# Patient Record
Sex: FEMALE | Race: WHITE | NOT HISPANIC OR LATINO | Employment: OTHER | URBAN - NONMETROPOLITAN AREA
[De-identification: names, ages, dates, MRNs, and addresses within clinical notes are randomized per-mention and may not be internally consistent; named-entity substitution may affect disease eponyms.]

---

## 2021-05-26 ENCOUNTER — HOSPITAL ENCOUNTER (INPATIENT)
Facility: OTHER | Age: 81
LOS: 3 days | Discharge: HOME | DRG: 305 | End: 2021-05-29
Attending: FAMILY MEDICINE | Admitting: FAMILY MEDICINE
Payer: MEDICARE

## 2021-05-26 DIAGNOSIS — I16.1 HYPERTENSIVE EMERGENCY: Primary | ICD-10-CM

## 2021-05-26 DIAGNOSIS — E83.51 HYPOCALCEMIA: ICD-10-CM

## 2021-05-26 PROBLEM — E86.0 DEHYDRATION: Status: ACTIVE | Noted: 2021-05-26

## 2021-05-26 PROBLEM — K90.9 DIARRHEA DUE TO MALABSORPTION: Status: ACTIVE | Noted: 2021-05-26

## 2021-05-26 PROBLEM — R55 SYNCOPE: Status: ACTIVE | Noted: 2021-05-26

## 2021-05-26 PROBLEM — R19.7 DIARRHEA DUE TO MALABSORPTION: Status: ACTIVE | Noted: 2021-05-26

## 2021-05-26 PROBLEM — E87.6 HYPOKALEMIA: Status: ACTIVE | Noted: 2021-05-26

## 2021-05-26 LAB
ALBUMIN SERPL-MCNC: 3.3 G/DL (ref 3.5–5.7)
ALP SERPL-CCNC: 39 U/L (ref 34–104)
ALT SERPL W P-5'-P-CCNC: 6 U/L (ref 7–52)
ANION GAP SERPL CALCULATED.3IONS-SCNC: 10 MMOL/L (ref 3–14)
AST SERPL W P-5'-P-CCNC: 15 U/L (ref 13–39)
BILIRUB SERPL-MCNC: 1.2 MG/DL (ref 0.3–1)
BUN SERPL-MCNC: 7 MG/DL (ref 7–25)
CALCIUM SERPL-MCNC: 6.6 MG/DL (ref 8.6–10.3)
CHLORIDE SERPL-SCNC: 102 MMOL/L (ref 98–107)
CO2 SERPL-SCNC: 28 MMOL/L (ref 21–31)
CREAT SERPL-MCNC: 0.77 MG/DL (ref 0.6–1.2)
DEPRECATED CALCIDIOL+CALCIFEROL SERPL-MC: <10 NG/ML
GFR SERPL CREATININE-BSD FRML MDRD: 72 ML/MIN/{1.73_M2}
GLUCOSE SERPL-MCNC: 143 MG/DL (ref 70–105)
MAGNESIUM SERPL-MCNC: 2.3 MG/DL (ref 1.9–2.7)
POTASSIUM SERPL-SCNC: 3.2 MMOL/L (ref 3.5–5.1)
PROT SERPL-MCNC: 5.9 G/DL (ref 6.4–8.9)
SODIUM SERPL-SCNC: 140 MMOL/L (ref 134–144)
TROPONIN I SERPL-MCNC: 61.6 PG/ML

## 2021-05-26 PROCEDURE — 250N000009 HC RX 250: Performed by: FAMILY MEDICINE

## 2021-05-26 PROCEDURE — 84484 ASSAY OF TROPONIN QUANT: CPT | Performed by: FAMILY MEDICINE

## 2021-05-26 PROCEDURE — 250N000013 HC RX MED GY IP 250 OP 250 PS 637: Performed by: FAMILY MEDICINE

## 2021-05-26 PROCEDURE — 80053 COMPREHEN METABOLIC PANEL: CPT | Performed by: FAMILY MEDICINE

## 2021-05-26 PROCEDURE — 93005 ELECTROCARDIOGRAM TRACING: CPT

## 2021-05-26 PROCEDURE — 93010 ELECTROCARDIOGRAM REPORT: CPT | Performed by: INTERNAL MEDICINE

## 2021-05-26 PROCEDURE — 82306 VITAMIN D 25 HYDROXY: CPT | Performed by: FAMILY MEDICINE

## 2021-05-26 PROCEDURE — 99222 1ST HOSP IP/OBS MODERATE 55: CPT | Mod: AI | Performed by: FAMILY MEDICINE

## 2021-05-26 PROCEDURE — 250N000011 HC RX IP 250 OP 636: Performed by: FAMILY MEDICINE

## 2021-05-26 PROCEDURE — 258N000003 HC RX IP 258 OP 636: Performed by: FAMILY MEDICINE

## 2021-05-26 PROCEDURE — 36415 COLL VENOUS BLD VENIPUNCTURE: CPT | Performed by: FAMILY MEDICINE

## 2021-05-26 PROCEDURE — 200N000001 HC R&B ICU

## 2021-05-26 PROCEDURE — 83735 ASSAY OF MAGNESIUM: CPT | Performed by: FAMILY MEDICINE

## 2021-05-26 RX ORDER — CLONIDINE HYDROCHLORIDE 0.1 MG/1
0.2 TABLET ORAL 2 TIMES DAILY
Status: DISCONTINUED | OUTPATIENT
Start: 2021-05-26 | End: 2021-05-29 | Stop reason: HOSPADM

## 2021-05-26 RX ORDER — SODIUM CHLORIDE AND POTASSIUM CHLORIDE 150; 900 MG/100ML; MG/100ML
INJECTION, SOLUTION INTRAVENOUS CONTINUOUS
Status: DISCONTINUED | OUTPATIENT
Start: 2021-05-26 | End: 2021-05-28

## 2021-05-26 RX ORDER — POTASSIUM CHLORIDE 1500 MG/1
40 TABLET, EXTENDED RELEASE ORAL ONCE
Status: COMPLETED | OUTPATIENT
Start: 2021-05-26 | End: 2021-05-26

## 2021-05-26 RX ORDER — PROCHLORPERAZINE MALEATE 5 MG
5 TABLET ORAL EVERY 6 HOURS PRN
Status: DISCONTINUED | OUTPATIENT
Start: 2021-05-26 | End: 2021-05-29 | Stop reason: HOSPADM

## 2021-05-26 RX ORDER — LIDOCAINE 40 MG/G
CREAM TOPICAL
Status: DISCONTINUED | OUTPATIENT
Start: 2021-05-26 | End: 2021-05-29 | Stop reason: HOSPADM

## 2021-05-26 RX ORDER — ONDANSETRON 4 MG/1
4 TABLET, ORALLY DISINTEGRATING ORAL EVERY 6 HOURS PRN
Status: DISCONTINUED | OUTPATIENT
Start: 2021-05-26 | End: 2021-05-29 | Stop reason: HOSPADM

## 2021-05-26 RX ORDER — ACETAMINOPHEN 325 MG/1
650 TABLET ORAL EVERY 4 HOURS PRN
Status: DISCONTINUED | OUTPATIENT
Start: 2021-05-26 | End: 2021-05-29 | Stop reason: HOSPADM

## 2021-05-26 RX ORDER — ONDANSETRON 2 MG/ML
4 INJECTION INTRAMUSCULAR; INTRAVENOUS EVERY 6 HOURS PRN
Status: DISCONTINUED | OUTPATIENT
Start: 2021-05-26 | End: 2021-05-29 | Stop reason: HOSPADM

## 2021-05-26 RX ORDER — SPIRONOLACTONE 25 MG/1
25 TABLET ORAL DAILY
Status: DISCONTINUED | OUTPATIENT
Start: 2021-05-26 | End: 2021-05-27

## 2021-05-26 RX ORDER — LISINOPRIL 40 MG/1
40 TABLET ORAL DAILY
Status: DISCONTINUED | OUTPATIENT
Start: 2021-05-27 | End: 2021-05-29 | Stop reason: HOSPADM

## 2021-05-26 RX ORDER — DIPHENOXYLATE HCL/ATROPINE 2.5-.025MG
1 TABLET ORAL 4 TIMES DAILY PRN
Status: DISCONTINUED | OUTPATIENT
Start: 2021-05-26 | End: 2021-05-29 | Stop reason: HOSPADM

## 2021-05-26 RX ORDER — PROCHLORPERAZINE 25 MG
12.5 SUPPOSITORY, RECTAL RECTAL EVERY 12 HOURS PRN
Status: DISCONTINUED | OUTPATIENT
Start: 2021-05-26 | End: 2021-05-29 | Stop reason: HOSPADM

## 2021-05-26 RX ORDER — HYDRALAZINE HYDROCHLORIDE 20 MG/ML
10 INJECTION INTRAMUSCULAR; INTRAVENOUS EVERY 6 HOURS PRN
Status: DISCONTINUED | OUTPATIENT
Start: 2021-05-26 | End: 2021-05-29 | Stop reason: HOSPADM

## 2021-05-26 RX ADMIN — CALCIUM GLUCONATE 1 G: 94 INJECTION, SOLUTION INTRAVENOUS at 22:48

## 2021-05-26 RX ADMIN — CLONIDINE HYDROCHLORIDE 0.2 MG: 0.1 TABLET ORAL at 21:52

## 2021-05-26 RX ADMIN — POTASSIUM CHLORIDE AND SODIUM CHLORIDE: 900; 150 INJECTION, SOLUTION INTRAVENOUS at 21:52

## 2021-05-26 RX ADMIN — POTASSIUM CHLORIDE 40 MEQ: 1500 TABLET, EXTENDED RELEASE ORAL at 22:47

## 2021-05-26 RX ADMIN — FAMOTIDINE 20 MG: 10 INJECTION, SOLUTION INTRAVENOUS at 21:52

## 2021-05-26 ASSESSMENT — MIFFLIN-ST. JEOR: SCORE: 1136.38

## 2021-05-27 LAB
ALBUMIN SERPL-MCNC: 2.9 G/DL (ref 3.5–5.7)
ALP SERPL-CCNC: 35 U/L (ref 34–104)
ALT SERPL W P-5'-P-CCNC: 4 U/L (ref 7–52)
ANION GAP SERPL CALCULATED.3IONS-SCNC: 8 MMOL/L (ref 3–14)
AST SERPL W P-5'-P-CCNC: 12 U/L (ref 13–39)
BILIRUB SERPL-MCNC: 1.1 MG/DL (ref 0.3–1)
BUN SERPL-MCNC: 7 MG/DL (ref 7–25)
CALCIUM SERPL-MCNC: 6.4 MG/DL (ref 8.6–10.3)
CALCIUM SERPL-MCNC: 6.5 MG/DL (ref 8.6–10.3)
CALCIUM SERPL-MCNC: 6.6 MG/DL (ref 8.6–10.3)
CHLORIDE SERPL-SCNC: 106 MMOL/L (ref 98–107)
CO2 SERPL-SCNC: 27 MMOL/L (ref 21–31)
CREAT SERPL-MCNC: 0.72 MG/DL (ref 0.6–1.2)
ERYTHROCYTE [DISTWIDTH] IN BLOOD BY AUTOMATED COUNT: 15 % (ref 10–15)
GFR SERPL CREATININE-BSD FRML MDRD: 78 ML/MIN/{1.73_M2}
GLUCOSE SERPL-MCNC: 104 MG/DL (ref 70–105)
HCT VFR BLD AUTO: 27.5 % (ref 35–47)
HGB BLD-MCNC: 8.9 G/DL (ref 11.7–15.7)
INTERPRETATION ECG - MUSE: NORMAL
MAGNESIUM SERPL-MCNC: 2.1 MG/DL (ref 1.9–2.7)
MCH RBC QN AUTO: 30.5 PG (ref 26.5–33)
MCHC RBC AUTO-ENTMCNC: 32.4 G/DL (ref 31.5–36.5)
MCV RBC AUTO: 94 FL (ref 78–100)
PLATELET # BLD AUTO: 183 10E9/L (ref 150–450)
POTASSIUM SERPL-SCNC: 3.7 MMOL/L (ref 3.5–5.1)
PROT SERPL-MCNC: 4.8 G/DL (ref 6.4–8.9)
PTH-INTACT SERPL-MCNC: 242 PG/ML (ref 12–88)
RBC # BLD AUTO: 2.92 10E12/L (ref 3.8–5.2)
SODIUM SERPL-SCNC: 141 MMOL/L (ref 134–144)
TROPONIN I SERPL-MCNC: 46.5 PG/ML
WBC # BLD AUTO: 5.2 10E9/L (ref 4–11)

## 2021-05-27 PROCEDURE — 85027 COMPLETE CBC AUTOMATED: CPT | Performed by: FAMILY MEDICINE

## 2021-05-27 PROCEDURE — 99232 SBSQ HOSP IP/OBS MODERATE 35: CPT | Performed by: FAMILY MEDICINE

## 2021-05-27 PROCEDURE — 36415 COLL VENOUS BLD VENIPUNCTURE: CPT | Performed by: FAMILY MEDICINE

## 2021-05-27 PROCEDURE — 84484 ASSAY OF TROPONIN QUANT: CPT | Performed by: FAMILY MEDICINE

## 2021-05-27 PROCEDURE — 80053 COMPREHEN METABOLIC PANEL: CPT | Performed by: FAMILY MEDICINE

## 2021-05-27 PROCEDURE — 250N000009 HC RX 250: Performed by: FAMILY MEDICINE

## 2021-05-27 PROCEDURE — 250N000011 HC RX IP 250 OP 636: Performed by: INTERNAL MEDICINE

## 2021-05-27 PROCEDURE — 83735 ASSAY OF MAGNESIUM: CPT | Performed by: FAMILY MEDICINE

## 2021-05-27 PROCEDURE — 200N000001 HC R&B ICU

## 2021-05-27 PROCEDURE — 258N000003 HC RX IP 258 OP 636: Performed by: INTERNAL MEDICINE

## 2021-05-27 PROCEDURE — 250N000011 HC RX IP 250 OP 636: Performed by: FAMILY MEDICINE

## 2021-05-27 PROCEDURE — 82310 ASSAY OF CALCIUM: CPT | Performed by: FAMILY MEDICINE

## 2021-05-27 PROCEDURE — 250N000013 HC RX MED GY IP 250 OP 250 PS 637: Performed by: FAMILY MEDICINE

## 2021-05-27 PROCEDURE — 83970 ASSAY OF PARATHORMONE: CPT | Performed by: FAMILY MEDICINE

## 2021-05-27 RX ORDER — CALCIUM CARBONATE 500 MG/1
1000 TABLET, CHEWABLE ORAL ONCE
Status: COMPLETED | OUTPATIENT
Start: 2021-05-27 | End: 2021-05-27

## 2021-05-27 RX ORDER — LOPERAMIDE HCL 2 MG
2 CAPSULE ORAL 4 TIMES DAILY PRN
COMMUNITY

## 2021-05-27 RX ORDER — PANTOPRAZOLE SODIUM 40 MG/1
40 TABLET, DELAYED RELEASE ORAL DAILY
Status: ON HOLD | COMMUNITY
End: 2022-09-26

## 2021-05-27 RX ORDER — FUROSEMIDE 10 MG/ML
20 INJECTION INTRAMUSCULAR; INTRAVENOUS ONCE
Status: COMPLETED | OUTPATIENT
Start: 2021-05-27 | End: 2021-05-27

## 2021-05-27 RX ORDER — CLONIDINE HYDROCHLORIDE 0.2 MG/1
0.2 TABLET ORAL 2 TIMES DAILY
COMMUNITY

## 2021-05-27 RX ORDER — ATENOLOL 100 MG/1
100 TABLET ORAL EVERY EVENING
COMMUNITY
End: 2022-09-26

## 2021-05-27 RX ORDER — LISINOPRIL 40 MG/1
40 TABLET ORAL DAILY
COMMUNITY

## 2021-05-27 RX ORDER — ACETAMINOPHEN 500 MG
500 TABLET ORAL EVERY 6 HOURS PRN
COMMUNITY

## 2021-05-27 RX ORDER — FUROSEMIDE 20 MG
20 TABLET ORAL DAILY
COMMUNITY
End: 2022-09-26

## 2021-05-27 RX ORDER — NITROFURANTOIN 25; 75 MG/1; MG/1
100 CAPSULE ORAL 2 TIMES DAILY PRN
COMMUNITY
End: 2022-09-26

## 2021-05-27 RX ORDER — VITAMIN B COMPLEX
125 TABLET ORAL DAILY
Status: DISCONTINUED | OUTPATIENT
Start: 2021-05-27 | End: 2021-05-29 | Stop reason: HOSPADM

## 2021-05-27 RX ADMIN — FAMOTIDINE 20 MG: 10 INJECTION, SOLUTION INTRAVENOUS at 21:59

## 2021-05-27 RX ADMIN — ACETAMINOPHEN 650 MG: 325 TABLET, FILM COATED ORAL at 11:53

## 2021-05-27 RX ADMIN — CALCIUM CARBONATE (ANTACID) CHEW TAB 500 MG 1000 MG: 500 CHEW TAB at 14:45

## 2021-05-27 RX ADMIN — CALCIUM CARBONATE (ANTACID) CHEW TAB 500 MG 1000 MG: 500 CHEW TAB at 07:28

## 2021-05-27 RX ADMIN — CALCIUM GLUCONATE 1 G: 94 INJECTION, SOLUTION INTRAVENOUS at 22:35

## 2021-05-27 RX ADMIN — CLONIDINE HYDROCHLORIDE 0.2 MG: 0.1 TABLET ORAL at 21:57

## 2021-05-27 RX ADMIN — CLONIDINE HYDROCHLORIDE 0.2 MG: 0.1 TABLET ORAL at 09:26

## 2021-05-27 RX ADMIN — CHOLECALCIFEROL (VITAMIN D3) 25 MCG (1,000 UNIT) TABLET 125 MCG: at 09:26

## 2021-05-27 RX ADMIN — CALCIUM GLUCONATE 1 G: 94 INJECTION, SOLUTION INTRAVENOUS at 21:04

## 2021-05-27 RX ADMIN — FUROSEMIDE 20 MG: 10 INJECTION, SOLUTION INTRAMUSCULAR; INTRAVENOUS at 09:26

## 2021-05-27 RX ADMIN — POTASSIUM CHLORIDE AND SODIUM CHLORIDE: 900; 150 INJECTION, SOLUTION INTRAVENOUS at 15:13

## 2021-05-27 RX ADMIN — ACETAMINOPHEN 650 MG: 325 TABLET, FILM COATED ORAL at 07:29

## 2021-05-27 RX ADMIN — FAMOTIDINE 20 MG: 10 INJECTION, SOLUTION INTRAVENOUS at 09:26

## 2021-05-27 RX ADMIN — LISINOPRIL 40 MG: 40 TABLET ORAL at 09:26

## 2021-05-27 ASSESSMENT — ACTIVITIES OF DAILY LIVING (ADL)
ADLS_ACUITY_SCORE: 18
ADLS_ACUITY_SCORE: 17

## 2021-05-27 ASSESSMENT — MIFFLIN-ST. JEOR: SCORE: 1142.38

## 2021-05-27 NOTE — PHARMACY-ADMISSION MEDICATION HISTORY
"Pharmacy -- Admission Medication Reconciliation    Prior to admission (PTA) medications were reviewed and the patient's PTA medication list was updated.    Sources Consulted: Patient phone interview, [facility] med list and fill history ([phone], spoke with Vivian), [facility] Everywhere notes    The reliability of this Medication Reconciliation is: Reliability: Reliable    The following significant changes were made:  -Added patient's preferred outpatient pharmacy ([facility], [address] [place] [address] location)  -Added Atenolol  -Added Clonidine  -Added Lisinopril  -Added Loperamide  -Added Acetaminophen- patient states she takes PRN, but usually takes 3,000 mg/day  -Added Pantoprazole  -Added Furosemide- per [facility] list, Furosemide is listed as daily SCHEDULED, but patient reports only taking PRN, as she \"lives in the bathroom\" if she takes it consistently. Reports only taking about 2x/week. Per [facility], this was last filled in 2019- when patient was asked about this, she states that she has 2 full bottles at home.      **Notes: patient is prescribed Macrobid, which she reports that she always has on hand due to frequent UTI's. Reports that she only takes one or two at first onset of symptoms of a UTI to \"nip it in the bud.\"    In addition, the patient's allergies were reviewed with the patient and updated as follows:   Allergies: Baclofen, Cortisone, Nabumetone, Sulfa drugs, Erythromycin, Lactose, Nifedipine, Orange fruit [citrus], Amlodipine, Beta adrenergic blockers, Ciprofloxacin, Codeine, Cozaar [losartan], Darvon [propoxyphene], Dilaudid [hydromorphone], Doxazosin, Egg yolk, Ferrous sulfate, Hydrochlorothiazide, Penicillins, Talwin [pentazocine], Vasotec [enalapril], and Yellow dye, Spironolactone    **Please note, all allergies were added except for codeine- this list updated to reflect allergy list in [facility] Everywhere notes. Patient unsure of most allergies off the " "top of her head, but states that [facility] list should be accurate because she \"keeps on them to keep updated.\" Spironolactone not yet listed as an allergy in [facility] list, but patient reports that she was recently prescribed (5/7, per notes) and that she stopped taking after a few days due to diarrhea and confusion.    -Patient unsure of specific beta-blockers she has tried, but reports that she tolerates Atenolol, and only one other one, but is unsure of name. Reports that she has an updated allergy list she usually keeps in her purse, but did not bring it with her.      The pharmacist has reviewed with the patient that all personal medications should be removed from the building or locked in the belongings safe.  Patient shall only take medications ordered by the physician and administered by the nursing staff.       Medication barriers identified: patient reports not taking Furosemide as prescribed, as it interferes with her daily activities- reports only taking 2x/week   Medication adherence concerns: See above note regarding Furosemide; patient denies any missed doses with other meds   Understanding of emergency medications: N/A    Marcos Hicks [facility], 5/27/2021,  10:54 AM     "

## 2021-05-27 NOTE — PROGRESS NOTES
[facility]    Medicine Progress Note - Hospitalist Service       Date of Admission:  5/26/2021  Assessment & Plan       Annie Grover is a 80 year old female admitted on 5/26/2021. She has had a few days of profuse watery diarrhea.  Today had a syncopal episode in the garage.  No head trauma but was found in outside emergency department to have severe hypokalemia, hypomagnesemia, hypocalcemia as well as elevated blood pressure.    She reports chronically elevated blood pressure with typical readings in the 150s systolic.  Denies any symptoms for endorgan damage such as headache, confusion, chest pain, shortness of breath.    Patient's troponin has trended downward overnight with improved blood pressure control.    Drop in hemoglobin likely dilutional.    Principal Problem:    Hypertensive emergency     Assessment: Blood pressure has improved overnight.  Has not required hydralazine.    Plan: Continue home medications.  Hydralazine as needed SBP greater than 180.  Active Problems:    Hypocalcemia    Assessment: Unclear etiology, urgent was told PTH was done at outside facility but I do not see that pending.  Will order.  Vitamin D less than 10, will replace.  May be simply due to losses with poor intake and malabsorption/diarrhea.  Currently appears asymptomatic.    Plan: Corrected calcium around 7.5 this morning.  Will give oral calcium and recheck in six hours.     Syncope    Assessment: Unclear etiology.  No recurrent symptoms.  Most likely due to dehydration and electrolyte abnormality.    Plan: Continue on telemetry.     Diarrhea due to malabsorption    Assessment: Patient reports this is due to spironolactone and frequently will have issues with medications and food since undergoing radiation 34 years ago.    Plan: Imodium as needed.    Dehydration    Assessment: Patient received IV fluids at outside facility.  Currently euvolemic to slightly edematous although intravascularly  may be borderline deplete    Plan: reduce IVF to TKo and give one dose of lasix.    Hypokalemia    Assessment: Now normalized.    Plan:  MOnitor   Sciatica   Assessment:  Chronic, made worse by hospital bed.    Plan: PT/OT         Diet: Regular Diet Adult    DVT Prophylaxis: Pneumatic Compression Devices  Echavarria Catheter: not present  Code Status: Full Code           Disposition Plan   Expected discharge: 1-2d, recommended to prior living arrangement once blood pressure less than 160/90.  Entered: Kale Arreola MD 05/27/2021, 8:25 AM       The patient's care was discussed with the Patient and Patient's Family.    Kale Arreola MD  Hospitalist Service  [facility]  Contact information available via [facility] Paging/Directory    ______________________________________________________________________    Interval History   Patient reports that she feels better today.  Diarrhea has slowed down.  Has been able to eat a good breakfast.  Is having some sciatic-like pain.  This is a chronic issue for her but is worse with sleeping in hospital bed overnight.    She continues to deny any chest pain, shortness of breath or any other cardiopulmonary symptoms.  She has not had any recurrent episodes of dizziness or presyncope.  Nursing notes reviewed.  No fevers or chills.    Data reviewed today: I reviewed all medications, new labs and imaging results over the last 24 hours. I personally reviewed no images or EKG's today.    Physical Exam   Vital Signs: Temp: 98  F (36.7  C) Temp src: Temporal BP: (!) 162/77 Pulse: 73   Resp: 18 SpO2: 94 % O2 Device: None (Room air)    Weight: 160 lbs 14.97 oz  Constitutional: awake, alert, cooperative, no apparent distress, and appears stated age  Respiratory: No increased work of breathing, good air exchange, clear to auscultation bilaterally, no crackles or wheezing  Cardiovascular: Regular rate and rhythm.  No murmurs rubs or gallops heard.   GI: Abdomen Soft,  non-tender.  No masses, rebound or guarding.   Neuropsychiatric: General: normal, calm and normal eye contact  Orientation: oriented to self, place, time and situation  Memory and insight: memory for past and recent events intact and thought process normal    Data   Recent Labs   Lab 05/27/21 0523 05/26/21  2121   WBC 5.2  --    HGB 8.9*  --    MCV 94  --      --     140   POTASSIUM 3.7 3.2*   CHLORIDE 106 102   CO2 27 28   BUN 7 7   CR 0.72 0.77   ANIONGAP 8 10   LOLI 6.4* 6.6*    143*   ALBUMIN 2.9* 3.3*   PROTTOTAL 4.8* 5.9*   BILITOTAL 1.1* 1.2*   ALKPHOS 35 39   ALT 4* 6*   AST 12* 15     No results found for this or any previous visit (from the past 24 hour(s)).

## 2021-05-27 NOTE — PROGRESS NOTES
Resting in bed.  Med per request for sciatic pain.  See MAR.  No c/o otherwise.  Arely.  Mayte.  MD here.  Cont to monitor.

## 2021-05-27 NOTE — PROGRESS NOTES
Resting in bed.  Has been up to BR frequently after lasix given.  Edema in LE's improved.  Med with tylenol for knee pain.  Cont to monitor.

## 2021-05-27 NOTE — PROGRESS NOTES
Admission Note    Data:  Annie Grover admitted to ICU room 917 from [place], [place], emergency room at 2040.      Action:  Dr. Arreola has been notified of admission. Pt oriented to unit, call light in reach.     Response:  Patient AAOx4, tolerated transfer well, denies pain, 1 bag of belongings and cane.    Magdiel Jimenez RN on 5/26/2021 at 8:46 PM

## 2021-05-27 NOTE — PROGRESS NOTES
MIRTHA Escobedo from [facility]  ER. called with report on Pt. Pt is to be admitted to ICU bed 917.  Report received by this RN. Pt has not yet left their facility. MIRTHA Escobedo verbalized they will call with update when Pt is ready to depart their hospital to transfer here via ambulance. MIRTHA Guillen

## 2021-05-27 NOTE — H&P
[facility]    History and Physical - Hospitalist Service       Date of Admission:  5/26/2021    Assessment & Plan   Annie Grover is a 80 year old female admitted on 5/26/2021. She has had a few days of profuse watery diarrhea.  Today had a syncopal episode in the garage.  No head trauma but was found in outside emergency department to have severe hypokalemia, hypomagnesemia, hypocalcemia as well as elevated blood pressure.    She reports chronically elevated blood pressure with typical readings in the 150s systolic.  Denies any symptoms for endorgan damage such as headache, confusion, chest pain, shortness of breath.    She does have persistent elevated troponin, slightly rising with no symptoms of angina or anginal equivalents.  This was discussed with cardiology by the emergency department physician and they felt it was due to creased demand with her hypertension and suggested medical management.  Her current condition supports that.  We will work on lowering her blood pressure and obtain repeat troponin in a.m.    Principal Problem:    Hypertensive emergency     Assessment: Blood pressure remains elevated.  Has been given her normal home medications except for her spironolactone which she blames for her current episode of diarrhea.  We will try to gain better control utilizing hydralazine.    Plan: Continue home medications.  Add hydralazine 10 mg IV every 6 hours as needed systolic blood pressure greater than 180.  Active Problems:    Hypocalcemia    Assessment: Unclear etiology, PTH pending.  Vitamin D pending.  May be simply due to losses with poor intake and malabsorption/diarrhea.  Currently appears asymptomatic.    Plan: Corrected calcium around 7.4.  Will add an additional gram of IV calcium and recheck in 6 hours.  Hopefully then we can transition to oral supplementation.     Syncope    Assessment: Unclear etiology.  No recurrent symptoms.  Most likely due to dehydration  and electrolyte abnormality.    Plan: Continue on telemetry.  Consider echocardiogram tomorrow.    Diarrhea due to malabsorption    Assessment: Patient reports this is due to spironolactone and frequently will have issues with medications and food since undergoing radiation 34 years ago.    Plan: Imodium as needed.    Dehydration    Assessment: Patient received IV fluids at outside facility.  Currently relatively euvolemic versus slightly hypovolemic yet.    Plan: Normal saline with 20 KCl at 100 mL/h.    Hypokalemia    Assessment: Had potassium of 2.4 on initial presentation, now has improved significantly but still below normal.    Plan: We will put 20 mEq/L of potassium in her IV fluid.  Also placed on magnesium and potassium replacement protocols       Diet: Advance Diet as Tolerated: Clear Liquid Diet    DVT Prophylaxis: Pneumatic Compression Devices  Echavarria Catheter: not present  Code Status: Full Code           Disposition Plan   Expected discharge: 2 - 3 days, recommended to prior living arrangement once blood pressure stable and electrolytes normalized.  Entered: Kale Arreola MD 05/26/2021, 10:13 PM     The patient's care was discussed with the Patient.    Kale Arreola MD  [facility]  Contact information available via [facility] Paging/Directory      ______________________________________________________________________    Chief Complaint   Diarrhea, dehydration, syncopal episode, electrolyte abnormalities    History is obtained from the patient    History of Present Illness   Annie Grover is a 80 year old female who presented via EMS to [place] emergency department after having syncopal episode.  Patient reports she has had several days of profuse watery diarrhea.  She thinks this is due to her spironolactone.  She reports several episodes with similar quality diarrhea that eventually resolved on its own.  Can be brought about by certain vegetables or medications.   Has had issues with it ever since having radiation following total abdominal hysterectomy for endometrial cancer.  She does not recall feeling dizzy prior to the event.  She denies any recent or remote chest pain, palpitations shortness of breath, transient or persistent neurologic deficits.    In the emergency department was found to have profound hypokalemia, hypomagnesemia and hypocalcemia.  Was given 3 g of magnesium, 2 g calcium carbonate and 60 mEq of potassium.  Was also found to be acutely hypertensive.  Patient does report chronically having systolic blood pressures in the 150s.  She had not taken her antihypertensives for the day.  These were given except for the spironolactone which she refused.    Currently she tells me that she feels well.  She reports no symptoms of hypertensive urgency such as headache, confusion, chest pain, shortness of breath, exertional indigestion.  She was found to have slowly rising mild troponin elevation however.  Cardiology consult and felt this was most likely consistent with increased demand due to hypertension and suggested medical management.    Review of Systems    CONSTITUTIONAL: NEGATIVE for fever, chills, change in weight  INTEGUMENTARY/SKIN: NEGATIVE for worrisome rashes, moles or lesions  EYES: NEGATIVE for vision changes or irritation  ENT/MOUTH: NEGATIVE for ear, mouth and throat problems  RESP: NEGATIVE for significant cough or SOB  CV: NEGATIVE for chest pain, palpitations or peripheral edema  GI: See HPI  : NEGATIVE for frequency, dysuria, or hematuria  MUSCULOSKELETAL: NEGATIVE for significant arthralgias or myalgia  NEURO: See HPI  ENDOCRINE: NEGATIVE for temperature intolerance, skin/hair changes  HEME: NEGATIVE for bleeding problems  PSYCHIATRIC: NEGATIVE for changes in mood or affect    Past Medical History      Allergic rhinitis due to other allergen 11/27/2000     Amblyopia, unspecified 08/31/2000   Right     Chalazion 08/31/2000   Left lower lid      HCD (health care directive) 10/25/2016     Malignant neoplasm of corpus uteri, except isthmus (HCC) 2/25/2004   Uterine cancer S/P hysterectomy     Partial small bowel obstruction (HCC) 11/12/2017     Retinal vascular occlusion, unspecified 2/25/2004   Retinal vessel occlusion     Unspecified essential hypertension 2/25/2004     Urinary tract infection, site not specified 11/12/2004     Past Surgical History     BIOPSY OF SKIN LESION 07/05/2000   Right lower lid     BIOPSY OF SKIN LESION 01/03/2011   Left shoulder - hyperkeratosis with regular acanthosis and minimal epidermal atypia     TOTAL ABDOM HYSTERECTOMY 1983   TAHBSO for uterine cancer     URETHRA SURGERY N/A 12/18/2015   Procedure: CYSTOSCOPY BULKING URETHRAL, intravesical botox injection 100 units; Surgeon: Jay Arredondo DO; Location: [facility] OR     URETHRA SURGERY N/A 3/23/2016   Procedure: intravesical Botox 150 units; Surgeon: Jay Arredondo DO; Location: [facility] OR     Social History   I have reviewed this patient's social history and updated it with pertinent information if needed.  Social History     Tobacco Use     Smoking status: Not on file   Substance Use Topics     Alcohol use: Not on file     Drug use: Not on file     Patient is .  She lives with her son which she reports has some developmental delay and cannot drive except in emergencies.  She has never smoked and does not drink alcohol.      Family History     Cardiovascular Disease Father   CHF     Rheumatoid Arthritis Mother     Other Son   Developemental delay - adopted       Prior to Admission Medications   None     Allergies   Allergies   Allergen Reactions     Codeine Nausea and Vomiting       Physical Exam   Vital Signs: Temp: 98.5  F (36.9  C) Temp src: Temporal BP: (!) 194/89 Pulse: 79   Resp: 19 SpO2: 98% O2 Device: None (Room air)    Weight: 0 lbs 0 oz    Constitutional: awake, alert, cooperative, no apparent distress, and appears stated age  Eyes: Lids and lashes normal,  pupils equal, round and reactive to light, extra ocular muscles intact, sclera clear, conjunctiva normal  ENT: Normocephalic, without obvious abnormality, atraumatic, sinuses nontender on palpation, external ears without lesions, oral pharynx with moist mucous membranes  Hematologic / Lymphatic: No lymphadenopathy  Respiratory: Clear to auscultation bilaterally  Cardiovascular: Regular rate and rhythm.  No murmurs rubs or gallops.  No lower extremity edema  GI: Abdomen soft, nontender.  Bowel sounds heard in all quadrants.  Genitounirinary: No suprapubic or CVA tenderness.  Skin: Patient has scrapes and bruises on both hands right greater than left.  No evidence of trauma on her head.  Musculoskeletal: No synovitis.  Muscle strength age and body habitus appropriateas well as equal and even.   Neurologic: Awake, alert, oriented to name, place and time.  Cranial nerves II-XII are grossly intact.  Motor is 5 out of 5 bilaterally.  Cerebellar finger to nose, heel to shin intact.  Sensory is intact.  Negative Chvostek sign.  Neuropsychiatric: General: normal, calm and normal eye contact  Orientation: oriented to self, place, time and situation  Memory and insight: memory for past and recent events intact and thought process normal    Data   Data reviewed today: I reviewed all medications, new labs and imaging results over the last 24 hours. I personally reviewed the EKG tracing showing Normal sinus rhythm with long QT..    Recent Labs   Lab 05/26/21  2121      POTASSIUM 3.2*   CHLORIDE 102   CO2 28   BUN 7   CR 0.77   ANIONGAP 10   LOLI 6.6*   *   ALBUMIN 3.3*   PROTTOTAL 5.9*   BILITOTAL 1.2*   ALKPHOS 39   ALT 6*   AST 15     No results found for this or any previous visit (from the past 24 hour(s)).

## 2021-05-28 LAB
ALBUMIN SERPL-MCNC: 2.9 G/DL (ref 3.5–5.7)
ALP SERPL-CCNC: 36 U/L (ref 34–104)
ALT SERPL W P-5'-P-CCNC: 5 U/L (ref 7–52)
ANION GAP SERPL CALCULATED.3IONS-SCNC: 7 MMOL/L (ref 3–14)
AST SERPL W P-5'-P-CCNC: 12 U/L (ref 13–39)
BILIRUB SERPL-MCNC: 1.2 MG/DL (ref 0.3–1)
BUN SERPL-MCNC: 10 MG/DL (ref 7–25)
CALCIUM SERPL-MCNC: 7.2 MG/DL (ref 8.6–10.3)
CHLORIDE SERPL-SCNC: 107 MMOL/L (ref 98–107)
CO2 SERPL-SCNC: 27 MMOL/L (ref 21–31)
CREAT SERPL-MCNC: 0.71 MG/DL (ref 0.6–1.2)
GFR SERPL CREATININE-BSD FRML MDRD: 79 ML/MIN/{1.73_M2}
GLUCOSE SERPL-MCNC: 116 MG/DL (ref 70–105)
MAGNESIUM SERPL-MCNC: 1.4 MG/DL (ref 1.9–2.7)
POTASSIUM SERPL-SCNC: 3.6 MMOL/L (ref 3.5–5.1)
PROT SERPL-MCNC: 5 G/DL (ref 6.4–8.9)
SODIUM SERPL-SCNC: 141 MMOL/L (ref 134–144)

## 2021-05-28 PROCEDURE — 250N000009 HC RX 250: Performed by: FAMILY MEDICINE

## 2021-05-28 PROCEDURE — 80053 COMPREHEN METABOLIC PANEL: CPT | Performed by: FAMILY MEDICINE

## 2021-05-28 PROCEDURE — 36415 COLL VENOUS BLD VENIPUNCTURE: CPT | Performed by: FAMILY MEDICINE

## 2021-05-28 PROCEDURE — 250N000013 HC RX MED GY IP 250 OP 250 PS 637: Performed by: FAMILY MEDICINE

## 2021-05-28 PROCEDURE — 250N000011 HC RX IP 250 OP 636: Performed by: FAMILY MEDICINE

## 2021-05-28 PROCEDURE — 83735 ASSAY OF MAGNESIUM: CPT | Performed by: FAMILY MEDICINE

## 2021-05-28 PROCEDURE — 99232 SBSQ HOSP IP/OBS MODERATE 35: CPT | Performed by: FAMILY MEDICINE

## 2021-05-28 PROCEDURE — 120N000001 HC R&B MED SURG/OB

## 2021-05-28 PROCEDURE — 250N000013 HC RX MED GY IP 250 OP 250 PS 637: Performed by: INTERNAL MEDICINE

## 2021-05-28 RX ORDER — CALCIUM CARBONATE 500 MG/1
1000 TABLET, CHEWABLE ORAL 2 TIMES DAILY
Status: DISCONTINUED | OUTPATIENT
Start: 2021-05-28 | End: 2021-05-29 | Stop reason: HOSPADM

## 2021-05-28 RX ORDER — TRAMADOL HYDROCHLORIDE 50 MG/1
50 TABLET ORAL ONCE
Status: COMPLETED | OUTPATIENT
Start: 2021-05-28 | End: 2021-05-28

## 2021-05-28 RX ORDER — LIDOCAINE 40 MG/G
CREAM TOPICAL EVERY 4 HOURS PRN
Status: COMPLETED | OUTPATIENT
Start: 2021-05-28 | End: 2021-05-28

## 2021-05-28 RX ORDER — ATENOLOL 50 MG/1
100 TABLET ORAL AT BEDTIME
Status: DISCONTINUED | OUTPATIENT
Start: 2021-05-28 | End: 2021-05-29 | Stop reason: HOSPADM

## 2021-05-28 RX ORDER — MAGNESIUM SULFATE HEPTAHYDRATE 40 MG/ML
4 INJECTION, SOLUTION INTRAVENOUS ONCE
Status: COMPLETED | OUTPATIENT
Start: 2021-05-28 | End: 2021-05-28

## 2021-05-28 RX ORDER — CALCIUM CARBONATE 500 MG/1
1000 TABLET, CHEWABLE ORAL 2 TIMES DAILY PRN
Status: DISCONTINUED | OUTPATIENT
Start: 2021-05-28 | End: 2021-05-28

## 2021-05-28 RX ORDER — FAMOTIDINE 20 MG/1
20 TABLET, FILM COATED ORAL 2 TIMES DAILY
Status: DISCONTINUED | OUTPATIENT
Start: 2021-05-28 | End: 2021-05-29 | Stop reason: HOSPADM

## 2021-05-28 RX ORDER — HYDRALAZINE HYDROCHLORIDE 10 MG/1
10 TABLET, FILM COATED ORAL EVERY 8 HOURS SCHEDULED
Status: DISCONTINUED | OUTPATIENT
Start: 2021-05-28 | End: 2021-05-29 | Stop reason: HOSPADM

## 2021-05-28 RX ORDER — CALCIUM CARBONATE 500 MG/1
500 TABLET, CHEWABLE ORAL DAILY PRN
Status: DISCONTINUED | OUTPATIENT
Start: 2021-05-28 | End: 2021-05-29 | Stop reason: HOSPADM

## 2021-05-28 RX ADMIN — ACETAMINOPHEN 650 MG: 325 TABLET, FILM COATED ORAL at 17:56

## 2021-05-28 RX ADMIN — HYDRALAZINE HYDROCHLORIDE 10 MG: 10 TABLET, FILM COATED ORAL at 13:38

## 2021-05-28 RX ADMIN — LIDOCAINE: 40 CREAM TOPICAL at 14:25

## 2021-05-28 RX ADMIN — FAMOTIDINE 20 MG: 20 TABLET ORAL at 21:53

## 2021-05-28 RX ADMIN — ACETAMINOPHEN 650 MG: 325 TABLET, FILM COATED ORAL at 22:57

## 2021-05-28 RX ADMIN — TRAMADOL HYDROCHLORIDE 50 MG: 50 TABLET ORAL at 18:39

## 2021-05-28 RX ADMIN — CALCIUM CARBONATE (ANTACID) CHEW TAB 500 MG 500 MG: 500 CHEW TAB at 20:37

## 2021-05-28 RX ADMIN — CALCIUM CARBONATE (ANTACID) CHEW TAB 500 MG 1000 MG: 500 CHEW TAB at 01:24

## 2021-05-28 RX ADMIN — MAGNESIUM SULFATE HEPTAHYDRATE 4 G: 40 INJECTION, SOLUTION INTRAVENOUS at 14:20

## 2021-05-28 RX ADMIN — ATENOLOL 100 MG: 50 TABLET ORAL at 19:07

## 2021-05-28 RX ADMIN — CALCIUM CARBONATE (ANTACID) CHEW TAB 500 MG 1000 MG: 500 CHEW TAB at 21:55

## 2021-05-28 RX ADMIN — LISINOPRIL 40 MG: 40 TABLET ORAL at 10:19

## 2021-05-28 RX ADMIN — FAMOTIDINE 20 MG: 10 INJECTION, SOLUTION INTRAVENOUS at 10:20

## 2021-05-28 RX ADMIN — ACETAMINOPHEN 650 MG: 325 TABLET, FILM COATED ORAL at 01:58

## 2021-05-28 RX ADMIN — HYDRALAZINE HYDROCHLORIDE 10 MG: 20 INJECTION INTRAMUSCULAR; INTRAVENOUS at 20:33

## 2021-05-28 RX ADMIN — HYDRALAZINE HYDROCHLORIDE 10 MG: 20 INJECTION INTRAMUSCULAR; INTRAVENOUS at 00:12

## 2021-05-28 RX ADMIN — CHOLECALCIFEROL (VITAMIN D3) 25 MCG (1,000 UNIT) TABLET 125 MCG: at 10:19

## 2021-05-28 RX ADMIN — CLONIDINE HYDROCHLORIDE 0.2 MG: 0.1 TABLET ORAL at 21:52

## 2021-05-28 RX ADMIN — ONDANSETRON 4 MG: 2 INJECTION INTRAMUSCULAR; INTRAVENOUS at 22:46

## 2021-05-28 RX ADMIN — HYDRALAZINE HYDROCHLORIDE 10 MG: 10 TABLET, FILM COATED ORAL at 22:00

## 2021-05-28 RX ADMIN — CLONIDINE HYDROCHLORIDE 0.2 MG: 0.1 TABLET ORAL at 10:19

## 2021-05-28 RX ADMIN — CALCIUM CARBONATE (ANTACID) CHEW TAB 500 MG 1000 MG: 500 CHEW TAB at 10:55

## 2021-05-28 RX ADMIN — ACETAMINOPHEN 650 MG: 325 TABLET, FILM COATED ORAL at 10:20

## 2021-05-28 ASSESSMENT — ACTIVITIES OF DAILY LIVING (ADL)
ADLS_ACUITY_SCORE: 19
ADLS_ACUITY_SCORE: 19
ADLS_ACUITY_SCORE: 20
ADLS_ACUITY_SCORE: 20
ADLS_ACUITY_SCORE: 19
ADLS_ACUITY_SCORE: 20

## 2021-05-28 ASSESSMENT — MIFFLIN-ST. JEOR: SCORE: 1278.38

## 2021-05-28 NOTE — PROGRESS NOTES
[facility]    Medicine Progress Note - Hospitalist Service       Date of Admission:  5/26/2021  Assessment & Plan       Hypertensive emergency  Chronic history of hypertension, poorly controlled on home medications  Multiple allergies and side effects which complicate choice  Currently taking lisinopril and Catapres  Has tolerated IV hydralazine will order this orally, continue to monitor blood pressures      Diarrhea due to malabsorption  Presented with diarrhea, possible side effect from spironolactone  Resolved at this time  Monitor    Syncope  Episode of passing out at home, etiology not clear  Possibly due to underlying dehydration from diarrhea  Doing well now, monitor    Dehydration  On admit was noted to be clinically dry  Normal renal function  Tolerating normal diet, making good urine    Hypocalcemia  Calcium low, improved with replacement  Continue calcium supplementation with Tums, recheck    Hypokalemia  Initially low, back to baseline with replacement  Monitor           Diet: Regular Diet Adult    DVT Prophylaxis: Blood pressure controlled  Echavarria Catheter: not present  Code Status: Full Code           Disposition Plan   Expected discharge: Tomorrow, recommended to prior living arrangement once blood pressure less than 150's systolic.  Entered: Raffaele Martin MD 05/28/2021, 10:34 AM       The patient's care was discussed with the Patient.    Raffaele Martin MD  Hospitalist Service  [facility]  Contact information available via [facility] Paging/Directory    ______________________________________________________________________    Interval History   Clinically feeling better, good appetite, not feeling dizzy or weak.  Denies nausea or vomiting, no more diarrhea.  Expresses concerns about her ability to control her blood pressure and would like more aggressive treatment.  Denies any side effects from high blood pressure including visual changes,  headaches    Data reviewed today: I reviewed all medications, new labs and imaging results over the last 24 hours. I personally reviewed no images or EKG's today.    Physical Exam   Vital Signs: Temp: 98.3  F (36.8  C) Temp src: Tympanic BP: (!) 170/72 Pulse: 83   Resp: 18 SpO2: 98 % O2 Device: None (Room air)    Weight: 190 lbs 14.69 oz  Constitutional: awake, alert, cooperative, no apparent distress, and appears stated age  Respiratory: No increased work of breathing, good air exchange, clear to auscultation bilaterally, no crackles or wheezing  Cardiovascular: regular rate and rhythm  GI: normal bowel sounds, soft and non-distended    Data   Recent Labs   Lab 05/28/21  0700 05/27/21  1905 05/27/21  1206 05/27/21  0523 05/26/21  2121   WBC  --   --   --  5.2  --    HGB  --   --   --  8.9*  --    MCV  --   --   --  94  --    PLT  --   --   --  183  --      --   --  141 140   POTASSIUM 3.6  --   --  3.7 3.2*   CHLORIDE 107  --   --  106 102   CO2 27  --   --  27 28   BUN 10  --   --  7 7   CR 0.71  --   --  0.72 0.77   ANIONGAP 7  --   --  8 10   LOLI 7.2* 6.5* 6.6* 6.4* 6.6*   *  --   --  104 143*   ALBUMIN 2.9*  --   --  2.9* 3.3*   PROTTOTAL 5.0*  --   --  4.8* 5.9*   BILITOTAL 1.2*  --   --  1.1* 1.2*   ALKPHOS 36  --   --  35 39   ALT 5*  --   --  4* 6*   AST 12*  --   --  12* 15

## 2021-05-28 NOTE — ASSESSMENT & PLAN NOTE
Calcium low, improved with replacement  Continue calcium supplementation with Tums, will need follow-up

## 2021-05-28 NOTE — ASSESSMENT & PLAN NOTE
Episode of passing out at home, etiology not clear  Possibly due to underlying dehydration from diarrhea  Doing well now, monitor

## 2021-05-28 NOTE — ASSESSMENT & PLAN NOTE
Chronic history of hypertension, poorly controlled on home medications  Multiple allergies and side effects which complicate choice  Currently taking lisinopril and Catapres  With addition of hydralazine, has had better control, no side effects  Plan to discharge today on atenelol,  lisinopril, catapres, hydralazine, Will restart lasix  Will need OP follow-up

## 2021-05-28 NOTE — PROGRESS NOTES
Patient has been up much of the night with various ailments.  Has gone for a walk, been up to the bathroom many times, and has received PRN tums and tylenol for stomach pain and discomfort and sciatic pain.  BP is still well controlled with PRN IV meds.  Will continue to monitor.

## 2021-05-28 NOTE — PLAN OF CARE
"Patient is alert and oriented x4, assist of 1 with FWW. Patient frequently gets up to use the bathroom, states that food and meds \"go right through\" her. VSS with some elevated BP's. LS clear. She has complained of left knee pain frequently today, worst when she gets up to the commode. PRN tylenol and lidocaine cream applied with no relief, PRN tramadol ordered and given. Has frequent complaints of other ailments, especially past ones. Very chatty. Is anxious to get home to her son.     BP (!) 187/81   Pulse 95   Temp 99  F (37.2  C) (Tympanic)   Resp 18   Ht 1.549 m (5' 1\")   Wt 86.6 kg (190 lb 14.7 oz)   SpO2 98%   BMI 36.07 kg/m      Arina Aparicio RN on 5/28/2021 at 6:46 PM    "

## 2021-05-28 NOTE — PROGRESS NOTES
SAFETY CHECKLIST  ID Bands and Risk clasps correct and in place (DNR, Fall risk, Allergy, Latex, Limb):  Yes  All Lines Reconciled and labeled correctly: Yes  Whiteboard updated:Yes  Environmental interventions (bed/chair alarm on, call light, side rails, restraints, sitter....): Yes     Verify Tele #: 5

## 2021-05-28 NOTE — PROVIDER NOTIFICATION
05/28/21 1840   Vital Signs   Resp 18   Pulse 95   Pulse Rate Source Monitor   BP (!) 187/81   Oxygen Therapy   SpO2 98 %   O2 Device None (Room air)     Updated Dr. Salas of patient's continued high blood pressures after given oral hydralazine this afternoon, see MAR.  Patient takes metoprolol at night and tolerates with allergies at home.  VORB to try daily HS dose early, also given tramadol for knee pain.  Will continue to monitor.  Patient denies any symptoms.

## 2021-05-28 NOTE — ASSESSMENT & PLAN NOTE
On admit was noted to be clinically dry  Normal renal function  Tolerating normal diet, making good urine

## 2021-05-28 NOTE — PROGRESS NOTES
"Patient has complaints of an upset stomach/acid reflux.  Requesting the medication that she takes at home that \"starts with a 'P'\".  She takes protonix at home.  She also takes Tums or Rolaids as well.  Spoke with tele ICU to request something to help with this.  Awaiting further orders.  "

## 2021-05-28 NOTE — PROGRESS NOTES
Added ASA to her allergy list, per patient request.  She states that one of her physicians told her not to take it because the lining of her stomach is too thin and she is at an increased risk of bleeding.

## 2021-05-28 NOTE — PLAN OF CARE
Problem: Adult Inpatient Plan of Care  Goal: Plan of Care Review  Outcome: No Change  Goal: Patient-Specific Goal (Individualized)  Outcome: No Change  Goal: Absence of Hospital-Acquired Illness or Injury  Outcome: No Change  Intervention: Identify and Manage Fall Risk  Recent Flowsheet Documentation  Taken 5/28/2021 0000 by Peggy Garcia RN  Safety Promotion/Fall Prevention:   safety round/check completed   patient and family education  Taken 5/27/2021 2000 by Peggy Garcia RN  Safety Promotion/Fall Prevention:   safety round/check completed   patient and family education  Intervention: Prevent Skin Injury  Recent Flowsheet Documentation  Taken 5/28/2021 0000 by Peggy Garcia RN  Body Position: position changed independently  Taken 5/27/2021 1955 by Peggy Garcia RN  Body Position: position changed independently  Intervention: Prevent and Manage VTE (Venous Thromboembolism) Risk  Recent Flowsheet Documentation  Taken 5/28/2021 0000 by Peggy Garcia RN  VTE Prevention/Management: ambulation promoted  Taken 5/27/2021 2000 by Peggy Garcia RN  VTE Prevention/Management: ambulation promoted  Goal: Optimal Comfort and Wellbeing  Outcome: No Change  Goal: Readiness for Transition of Care  Outcome: No Change     Problem: Electrolyte Imbalance  Goal: Electrolyte Balance  Outcome: No Change     Problem: Hypertension Acute  Goal: Blood Pressure Within Desired Range  Outcome: No Change

## 2021-05-28 NOTE — PHARMACY
Pharmacy - Transfer Medication Reconciliation     The patient's transfer medication orders have been compared to the medication administration record and to the Prior to Admissions Medications list - any noted discrepancies were resolved with the MD.     Thank you. Pharmacy will continue to monitor.     Cha Feldman [facility] ....................  5/28/2021   10:42 AM

## 2021-05-28 NOTE — PROGRESS NOTES
MD here.  Spoke with him about her calcium level of 6.5.  Orders placed by MD to infused 2g of calcium gluconate IV.  BP was quite high after ambulating in room.  Rechecked after resting for awhile, and it is now systolically in the 170's.  Will continue to monitor.

## 2021-05-28 NOTE — PROGRESS NOTES
BP quite elevated.  Administered PO clonidine.  Will continue to monitor BP and if no improvement, will administer PRN's.

## 2021-05-29 VITALS
WEIGHT: 156.4 LBS | OXYGEN SATURATION: 99 % | RESPIRATION RATE: 16 BRPM | DIASTOLIC BLOOD PRESSURE: 70 MMHG | HEIGHT: 61 IN | BODY MASS INDEX: 29.53 KG/M2 | HEART RATE: 67 BPM | SYSTOLIC BLOOD PRESSURE: 162 MMHG | TEMPERATURE: 97.7 F

## 2021-05-29 LAB
ANION GAP SERPL CALCULATED.3IONS-SCNC: 6 MMOL/L (ref 3–14)
BUN SERPL-MCNC: 12 MG/DL (ref 7–25)
CALCIUM SERPL-MCNC: 7.4 MG/DL (ref 8.6–10.3)
CHLORIDE SERPL-SCNC: 104 MMOL/L (ref 98–107)
CO2 SERPL-SCNC: 27 MMOL/L (ref 21–31)
CREAT SERPL-MCNC: 0.7 MG/DL (ref 0.6–1.2)
GFR SERPL CREATININE-BSD FRML MDRD: 81 ML/MIN/{1.73_M2}
GLUCOSE SERPL-MCNC: 127 MG/DL (ref 70–105)
MAGNESIUM SERPL-MCNC: 2 MG/DL (ref 1.9–2.7)
POTASSIUM SERPL-SCNC: 4.2 MMOL/L (ref 3.5–5.1)
POTASSIUM SERPL-SCNC: 4.2 MMOL/L (ref 3.5–5.1)
SODIUM SERPL-SCNC: 137 MMOL/L (ref 134–144)

## 2021-05-29 PROCEDURE — 99239 HOSP IP/OBS DSCHRG MGMT >30: CPT | Performed by: FAMILY MEDICINE

## 2021-05-29 PROCEDURE — 250N000013 HC RX MED GY IP 250 OP 250 PS 637: Performed by: FAMILY MEDICINE

## 2021-05-29 PROCEDURE — 36415 COLL VENOUS BLD VENIPUNCTURE: CPT | Performed by: FAMILY MEDICINE

## 2021-05-29 PROCEDURE — 83735 ASSAY OF MAGNESIUM: CPT | Performed by: FAMILY MEDICINE

## 2021-05-29 PROCEDURE — 84132 ASSAY OF SERUM POTASSIUM: CPT | Performed by: FAMILY MEDICINE

## 2021-05-29 PROCEDURE — 80048 BASIC METABOLIC PNL TOTAL CA: CPT | Performed by: FAMILY MEDICINE

## 2021-05-29 RX ORDER — HYDRALAZINE HYDROCHLORIDE 10 MG/1
10 TABLET, FILM COATED ORAL EVERY 8 HOURS
Qty: 90 TABLET | Refills: 0 | Status: SHIPPED | OUTPATIENT
Start: 2021-05-29 | End: 2022-09-26

## 2021-05-29 RX ORDER — CALCIUM CARBONATE 500 MG/1
2 TABLET, CHEWABLE ORAL 2 TIMES DAILY
COMMUNITY
Start: 2021-05-29 | End: 2022-09-26

## 2021-05-29 RX ADMIN — ACETAMINOPHEN 650 MG: 325 TABLET, FILM COATED ORAL at 03:09

## 2021-05-29 RX ADMIN — FAMOTIDINE 20 MG: 20 TABLET ORAL at 09:29

## 2021-05-29 RX ADMIN — CALCIUM CARBONATE (ANTACID) CHEW TAB 500 MG 500 MG: 500 CHEW TAB at 16:04

## 2021-05-29 RX ADMIN — LISINOPRIL 40 MG: 40 TABLET ORAL at 09:30

## 2021-05-29 RX ADMIN — CHOLECALCIFEROL (VITAMIN D3) 25 MCG (1,000 UNIT) TABLET 125 MCG: at 09:30

## 2021-05-29 RX ADMIN — CALCIUM CARBONATE (ANTACID) CHEW TAB 500 MG 1000 MG: 500 CHEW TAB at 09:29

## 2021-05-29 RX ADMIN — ACETAMINOPHEN 650 MG: 325 TABLET, FILM COATED ORAL at 16:04

## 2021-05-29 RX ADMIN — HYDRALAZINE HYDROCHLORIDE 10 MG: 10 TABLET, FILM COATED ORAL at 14:35

## 2021-05-29 RX ADMIN — CLONIDINE HYDROCHLORIDE 0.2 MG: 0.1 TABLET ORAL at 09:29

## 2021-05-29 RX ADMIN — HYDRALAZINE HYDROCHLORIDE 10 MG: 10 TABLET, FILM COATED ORAL at 06:08

## 2021-05-29 ASSESSMENT — ACTIVITIES OF DAILY LIVING (ADL)
ADLS_ACUITY_SCORE: 20

## 2021-05-29 ASSESSMENT — MIFFLIN-ST. JEOR: SCORE: 1121.81

## 2021-05-29 NOTE — DISCHARGE SUMMARY
[facility]  Hospitalist Discharge Summary      Date of Admission:  5/26/2021  Date of Discharge:  5/29/2021  Discharging Provider: Raffaele Martin MD      Discharge Diagnoses   Principal Problem:    Hypertensive emergency  Active Problems:    Syncope    Diarrhea due to malabsorption    Hypocalcemia    Dehydration    Hypokalemia        Follow-ups Needed After Discharge   Follow-up Appointments     Follow-up and recommended labs and tests       Follow up with primary care provider, Yareli Retana, within 7 days for   hospital follow- up.  The following labs/tests are recommended: recheck   electrolytes and BP.             Unresulted Labs Ordered in the Past 30 Days of this Admission     No orders found from 4/26/2021 to 5/27/2021.      These results will be followed up by Yareli Retana    Discharge Disposition   Discharged to home  Condition at discharge: Satisfactory      Hospital Course   Hypertensive emergency  Chronic history of hypertension, poorly controlled on home medications  Multiple allergies and side effects which complicate choice  Currently taking lisinopril and Catapres  With addition of hydralazine, has had better control, no side effects  Plan to discharge today on lisinopril, catapres, hydralazine, Will restart lasix  Will need OP follow-up      Diarrhea due to malabsorption  Presented with diarrhea, possible side effect from spironolactone  Resolved at this time  Monitor    Syncope  Episode of passing out at home, etiology not clear  Possibly due to underlying dehydration from diarrhea  Doing well now, monitor    Dehydration  On admit was noted to be clinically dry  Normal renal function  Tolerating normal diet, making good urine    Hypocalcemia  Calcium low, improved with replacement  Continue calcium supplementation with Tums, will need follow-up    Hypokalemia  Initially low, back to baseline with replacement  Monitor        Consultations This Hospital Stay    None    Code Status   Full Code    Time Spent on this Encounter   I, Raffaele Martin MD, personally saw the patient today and spent greater than 30 minutes discharging this patient.       Raffaele Martin MD  [facility]  [address]  [place] [address]  Phone: [phone]  Fax: [phone]  ______________________________________________________________________    Physical Exam   Vital Signs: Temp: 98  F (36.7  C) Temp src: Tympanic BP: (!) 188/81 Pulse: 78   Resp: 18 SpO2: 97 % O2 Device: None (Room air)    Weight: 156 lbs 6.4 oz  Constitutional: awake, alert, cooperative, no apparent distress, and appears stated age  Respiratory: No increased work of breathing, good air exchange, clear to auscultation bilaterally, no crackles or wheezing  Cardiovascular: regular rate and rhythm  GI: normal bowel sounds, soft, non-distended and non-tender       Primary Care Physician   Yareli Retana    Discharge Orders      Reason for your hospital stay    Admitted with dehydration and electrolyte abnormalities secondary to diarrhea     Follow-up and recommended labs and tests     Follow up with primary care provider, Yareli Retana, within 7 days for hospital follow- up.  The following labs/tests are recommended: recheck electrolytes and BP.     Activity    Your activity upon discharge: activity as tolerated     Full Code     Diet    Follow this diet upon discharge: Orders Placed This Encounter      Regular Diet Adult       Significant Results and Procedures   Most Recent 3 CBC's:  Recent Labs   Lab Test 05/27/21  0523   WBC 5.2   HGB 8.9*   MCV 94        Most Recent 3 BMP's:  Recent Labs   Lab Test 05/29/21  0620 05/28/21  0700 05/27/21  1905 05/27/21  0523 05/27/21  0523    141  --   --  141   POTASSIUM 4.2  4.2 3.6  --   --  3.7   CHLORIDE 104 107  --   --  106   CO2 27 27  --   --  27   BUN 12 10  --   --  7   CR 0.70 0.71  --   --  0.72   ANIONGAP 6 7  --   --   8   LOLI 7.4* 7.2* 6.5*   < > 6.4*   * 116*  --   --  104    < > = values in this interval not displayed.   ,   Results for orders placed or performed in visit on 07/17/15   PE NPET Skull Base To Mid Thigh Pet Pi    Narrative    PET/CT SCAN    INDICATIONS:  Endometrial cancer, for staging.    DOSE:  18.4 mCi  BLOOD GLUCOSE:  113 mg/dL    FINDINGS:  18.4 mCi of fluorine-18-deoxyglucose was injected, and  PET/CT scan was performed from the base of the brain to the upper  thigh.  Normal uptake is seen in the base of the brain.  In the neck,  no abnormal hypermetabolism is seen.  Cervical lymph nodes are normal  in caliber.  No pulmonary masses are seen.  There is no hilar or  mediastinal masses or lymphadenopathy.  In the upper abdomen, normal  uptake is seen in the liver. There is a faint calcification centrally  within a mass in the right lobe of the liver, but no abnormal  hypermetabolism is seen associated with this lesion.  The spleen,  pancreas, adrenal glands are normal.  Normal kidney activity is seen.  The periaortic lymph nodes are normal in caliber.  In the pelvis, the  bladder and rectum appear normal.  No abnormal hypermetabolism is seen  within the pelvis.  Pelvis and inguinal lymph nodes appear normal.  Normal uptake is seen in the regional skeleton.  In the anterior  abdominal wall postoperative changes are noted and there is some  increased tracer uptake associated with the abdominal wound.    IMPRESSION:  NO EVIDENCE OF METASTATIC DISEASE FROM THE PATIENT'S  ENDOMETRIAL CANCER.  NO ABNORMAL UPTAKE IS SEEN IN THE LIVER AT THE  SITE OF LOW-DENSITY MASSES SEEN ON RECENT CT SCAN IN THE RIGHT LOBE OF  THE LIVER.  ON THE UNENHANCED SCANS, THESE LESIONS ARE NOT AS WELL  VISUALIZED AS ON THE ENHANCED SCANS FROM JUNE 26, 2015, AND I CANNOT  COMMENT ON WHETHER THERE HAS BEEN A CHANGE IN SIZE OF THESE THREE  NODULES.            SIGNATURE PAGE ONLY  Exam Date: Jul 23, 2015 09:41:49 AM  Author: MADISON  "CHEO  This report is final and signed           Discharge Medications   Current Discharge Medication List      START taking these medications    Details   calcium carbonate (TUMS) 500 MG chewable tablet Take 2 tablets (1,000 mg) by mouth 2 times daily    Associated Diagnoses: Hypocalcemia      hydrALAZINE (APRESOLINE) 10 MG tablet Take 1 tablet (10 mg) by mouth every 8 hours  Qty: 90 tablet, Refills: 0    Associated Diagnoses: Hypertensive emergency         CONTINUE these medications which have NOT CHANGED    Details   acetaminophen (TYLENOL) 500 MG tablet Take 1,000 mg by mouth every 8 hours as needed for mild pain      atenolol (TENORMIN) 100 MG tablet Take 100 mg by mouth every evening      cloNIDine (CATAPRES) 0.2 MG tablet Take 0.2 mg by mouth 2 times daily      furosemide (LASIX) 20 MG tablet Take 20 mg by mouth daily      lisinopril (ZESTRIL) 40 MG tablet Take 40 mg by mouth daily      loperamide (IMODIUM) 2 MG capsule Take 2 mg by mouth 4 times daily as needed for diarrhea      nitroFURantoin macrocrystal-monohydrate (MACROBID) 100 MG capsule Take 100 mg by mouth 2 times daily as needed      pantoprazole (PROTONIX) 40 MG EC tablet Take 40 mg by mouth daily           Allergies   Allergies   Allergen Reactions     Baclofen      Hypertension     Cortisone Nausea and Vomiting     Lower abdominal pain, chills     Nabumetone Swelling and Rash     Spironolactone Diarrhea     Confusion     Sulfa Drugs Hives     Aspirin GI Disturbance     Per patient, her MD told her to never take this due to her \"thin intestinal wall, putting her at a higher risk for bleeding.\"     Erythromycin      Lactose      Nifedipine      Orange Fruit [Citrus]      Amlodipine Cramps     Abdominal pain     Beta Adrenergic Blockers Unknown     Headaches, diarrhea, vomiting- tolerates Atenolol; per patient, has tried several, but cannot remember specific drug names     Ciprofloxacin Diarrhea     Codeine Nausea and Vomiting     Cozaar " [Losartan] Cough     Darvon [Propoxyphene] Nausea and Vomiting     Dilaudid [Hydromorphone] Nausea and Vomiting     Doxazosin Nausea     Egg Yolk Nausea     Nausea only, denies reaction to Propofol     Ferrous Sulfate Diarrhea     Hydrochlorothiazide Nausea and Vomiting     Penicillins Nausea and Vomiting     Talwin [Pentazocine] Nausea and Vomiting     Vasotec [Enalapril]      GI side effects     Yellow Dye Nausea and Vomiting and Cramps

## 2021-05-29 NOTE — PLAN OF CARE
"Pt A & O x 4, low grade temp (99.8) and elevated B/P's ( 214/80) at beginning of shift, PRN tylenol and hydralazine given and now WDL, see flow sheets. Lungs clear. Bowel sounds active, abdomen rounded, intermittent nausea resolved with PRN zofran.  +2- +3 edema in BLE. Coccyx red, barrier cream applied. Pt 1 pivot assist with walker to bedside commode, incontinent of urine at times.  Pt reports left leg pain 3/10, PRN tylenol given, will continue to monitor.          /60   Pulse 80   Temp 96.8  F (36  C) (Tympanic)   Resp 18   Ht 1.549 m (5' 1\")   Wt 70.9 kg (156 lb 6.4 oz)   SpO2 96%   BMI 29.55 kg/m      "

## 2021-05-29 NOTE — PLAN OF CARE
05/29/21 0828 05/29/21 1135   Vital Signs   Temp 98  F (36.7  C)  --    Temp src Tympanic  --    Resp 18  --    Pulse 78  --    Pulse Rate Source Monitor  --    BP (!) 188/81 (!) 189/82   BP Location Left arm Left arm     Updated MD of patient's continued elevated blood pressure, patient denies any symptoms. Per MD fine to discharge with elevated blood pressures. Provided education on signs and symptoms of when to be seen for elevated blood pressures. Patient denies knee pain today, states PRN tylenol was enough this morning. Patient is alert and oriented and in agreement with discharge.     Arina Aparicio RN on 5/29/2021 at 11:42 AM

## 2021-05-29 NOTE — PROGRESS NOTES
SAFETY CHECKLIST  ID Bands and Risk clasps correct and in place (DNR, Fall risk, Allergy, Latex, Limb):  Yes  All Lines Reconciled and labeled correctly: Yes  Whiteboard updated:Yes  Environmental interventions (bed/chair alarm on, call light, side rails, restraints, sitter....): Yes  Verify Tele #:   6

## 2021-05-29 NOTE — PROGRESS NOTES
SAFETY CHECKLIST  ID Bands and Risk clasps correct and in place (DNR, Fall risk, Allergy, Latex, Limb):  Yes  All Lines Reconciled and labeled correctly: Yes  Whiteboard updated:Yes  Environmental interventions (bed/chair alarm on, call light, side rails, restraints, sitter....): Yes  Verify Tele #: 6    Arina Aparicio RN on 5/29/2021 at 8:10AM

## 2021-05-29 NOTE — PHARMACY - DISCHARGE MEDICATION RECONCILIATION AND EDUCATION
Pharmacy:  Discharge Counseling and Medication Reconciliation    [facility]  [address] [address]  [phone] (home)   80 year old female  PCP: Yareli Retana    Allergies: Baclofen, Cortisone, Nabumetone, Spironolactone, Sulfa drugs, Aspirin, Erythromycin, Lactose, Nifedipine, Orange fruit [citrus], Amlodipine, Beta adrenergic blockers, Ciprofloxacin, Codeine, Cozaar [losartan], Darvon [propoxyphene], Dilaudid [hydromorphone], Doxazosin, Egg yolk, Ferrous sulfate, Hydrochlorothiazide, Penicillins, Talwin [pentazocine], Vasotec [enalapril], and Yellow dye    Discharge Counseling:    Pharmacist met with patient (and/or family) today to review the medication portion of the After Visit Summary (with an emphasis on NEW medications) and to address patient's questions/concerns.    Summary of Education: met with patient regarding new medication administration, duration and side effects.  All questions answered.    Materials Provided:  MedCounselor sheets printed from Clinical Pharmacology on: hydralazine, tums    Discharge Medication Reconciliation:    It has been determined that the patient has an adequate supply of medications available or which can be obtained from the patient's preferred pharmacy, which HE/SHE has confirmed as: [facility]     Thank you for the consult.    Shirley Witt RPH........May 29, 2021 11:33 AM

## 2021-05-29 NOTE — PROGRESS NOTES
[facility] DISCHARGE NOTE    Patient discharged to home at 4:33 PM via wheel chair. Accompanied by  and staff. Discharge instructions reviewed with patient, opportunity offered to ask questions. Prescriptions sent to patients preferred pharmacy. All belongings sent with patient.    Arina Aparicio RN on 5/29/2021 at 4:33 PM

## 2021-06-01 ENCOUNTER — PATIENT OUTREACH (OUTPATIENT)
Dept: CARE COORDINATION | Facility: CLINIC | Age: 81
End: 2021-06-01

## 2021-06-01 NOTE — PROGRESS NOTES
Clinic Care Coordination Contact    Brief chart review due to recent hospitalization, patient has f/u appt in placed with PCP. PCP elsewhere, no TCM call required.     CORRIE Alatorre on 6/1/2021 at 8:10 AM

## 2022-09-26 ENCOUNTER — TRANSFERRED RECORDS (OUTPATIENT)
Dept: HEALTH INFORMATION MANAGEMENT | Facility: CLINIC | Age: 82
End: 2022-09-26

## 2022-09-26 ENCOUNTER — HOSPITAL ENCOUNTER (INPATIENT)
Facility: HOSPITAL | Age: 82
LOS: 1 days | Discharge: HOME | DRG: 641 | End: 2022-09-27
Attending: INTERNAL MEDICINE | Admitting: NURSE PRACTITIONER
Payer: MEDICARE

## 2022-09-26 DIAGNOSIS — N30.00 ACUTE CYSTITIS WITHOUT HEMATURIA: Primary | ICD-10-CM

## 2022-09-26 PROBLEM — M15.0 PRIMARY OSTEOARTHRITIS INVOLVING MULTIPLE JOINTS: Status: ACTIVE | Noted: 2017-07-06

## 2022-09-26 PROBLEM — E87.8 ELECTROLYTE ABNORMALITY: Status: ACTIVE | Noted: 2022-09-26

## 2022-09-26 PROBLEM — M99.73 CONNECTIVE TISSUE AND DISC STENOSIS OF INTERVERTEBRAL FORAMINA OF LUMBAR REGION: Status: ACTIVE | Noted: 2017-09-07

## 2022-09-26 PROBLEM — Z74.09 REDUCED MOBILITY: Status: ACTIVE | Noted: 2022-03-09

## 2022-09-26 PROBLEM — M25.651 HIP STIFFNESS, RIGHT: Status: ACTIVE | Noted: 2021-03-03

## 2022-09-26 PROBLEM — M25.551 RIGHT HIP PAIN: Status: ACTIVE | Noted: 2021-03-03

## 2022-09-26 PROBLEM — G89.29 CHRONIC PAIN OF RIGHT KNEE: Status: ACTIVE | Noted: 2021-05-02

## 2022-09-26 PROBLEM — M54.31 RIGHT SIDED SCIATICA: Status: ACTIVE | Noted: 2017-09-07

## 2022-09-26 PROBLEM — M51.379 DEGENERATION OF LUMBOSACRAL INTERVERTEBRAL DISC: Status: ACTIVE | Noted: 2017-09-07

## 2022-09-26 PROBLEM — M25.561 CHRONIC PAIN OF RIGHT KNEE: Status: ACTIVE | Noted: 2021-05-02

## 2022-09-26 PROBLEM — N39.0 RECURRENT UTI: Status: ACTIVE | Noted: 2018-01-15

## 2022-09-26 PROBLEM — I10 BENIGN ESSENTIAL HYPERTENSION: Status: ACTIVE | Noted: 2022-09-26

## 2022-09-26 PROBLEM — M62.81 MUSCLE WEAKNESS: Status: ACTIVE | Noted: 2021-03-03

## 2022-09-26 PROBLEM — E44.0 MODERATE PROTEIN-CALORIE MALNUTRITION (H): Status: ACTIVE | Noted: 2022-09-08

## 2022-09-26 LAB
ALT SERPL-CCNC: 6 IU/L (ref 6–31)
ANION GAP SERPL CALCULATED.3IONS-SCNC: 9 MMOL/L (ref 3–14)
AST SERPL-CCNC: 9 IU/L (ref 10–40)
BUN SERPL-MCNC: 14 MG/DL (ref 7–30)
CALCIUM SERPL-MCNC: 7.2 MG/DL (ref 8.5–10.1)
CHLORIDE BLD-SCNC: 99 MMOL/L (ref 94–109)
CO2 SERPL-SCNC: 28 MMOL/L (ref 20–32)
CREAT SERPL-MCNC: 1.09 MG/DL (ref 0.52–1.04)
CREATININE (EXTERNAL): 0.99 MG/DL (ref 0.4–1)
GFR ESTIMATED (EXTERNAL): 58 ML/MIN/1.73M2
GFR SERPL CREATININE-BSD FRML MDRD: 51 ML/MIN/1.73M2
GLUCOSE (EXTERNAL): 127 MG/DL (ref 70–99)
GLUCOSE BLD-MCNC: 115 MG/DL (ref 70–99)
INR (EXTERNAL): 1.3 (ref 0.9–1.1)
LACTATE SERPL-SCNC: 1.2 MMOL/L (ref 0.7–2)
MAGNESIUM SERPL-MCNC: 1.6 MG/DL (ref 1.6–2.3)
POTASSIUM (EXTERNAL): 2.6 MEQ/L (ref 3.4–5.1)
POTASSIUM BLD-SCNC: 3 MMOL/L (ref 3.4–5.3)
SODIUM SERPL-SCNC: 136 MMOL/L (ref 133–144)
TROPONIN I SERPL HS-MCNC: 46 NG/L

## 2022-09-26 PROCEDURE — 84484 ASSAY OF TROPONIN QUANT: CPT | Performed by: NURSE PRACTITIONER

## 2022-09-26 PROCEDURE — 83605 ASSAY OF LACTIC ACID: CPT | Performed by: NURSE PRACTITIONER

## 2022-09-26 PROCEDURE — 120N000001 HC R&B MED SURG/OB

## 2022-09-26 PROCEDURE — 99222 1ST HOSP IP/OBS MODERATE 55: CPT | Mod: AI | Performed by: NURSE PRACTITIONER

## 2022-09-26 PROCEDURE — 250N000011 HC RX IP 250 OP 636: Performed by: NURSE PRACTITIONER

## 2022-09-26 PROCEDURE — 250N000013 HC RX MED GY IP 250 OP 250 PS 637: Performed by: NURSE PRACTITIONER

## 2022-09-26 PROCEDURE — 36415 COLL VENOUS BLD VENIPUNCTURE: CPT | Performed by: NURSE PRACTITIONER

## 2022-09-26 PROCEDURE — 83735 ASSAY OF MAGNESIUM: CPT | Performed by: NURSE PRACTITIONER

## 2022-09-26 PROCEDURE — 82310 ASSAY OF CALCIUM: CPT | Performed by: NURSE PRACTITIONER

## 2022-09-26 RX ORDER — VITAMIN B COMPLEX
25 TABLET ORAL DAILY
Status: DISCONTINUED | OUTPATIENT
Start: 2022-09-26 | End: 2022-09-27 | Stop reason: HOSPADM

## 2022-09-26 RX ORDER — CEFTRIAXONE SODIUM 1 G/50ML
1 INJECTION, SOLUTION INTRAVENOUS EVERY 24 HOURS
Status: DISCONTINUED | OUTPATIENT
Start: 2022-09-27 | End: 2022-09-27 | Stop reason: HOSPADM

## 2022-09-26 RX ORDER — ATENOLOL 50 MG/1
100 TABLET ORAL EVERY EVENING
Status: DISCONTINUED | OUTPATIENT
Start: 2022-09-26 | End: 2022-09-27 | Stop reason: HOSPADM

## 2022-09-26 RX ORDER — FAMOTIDINE 20 MG/1
20 TABLET, FILM COATED ORAL 2 TIMES DAILY PRN
Status: DISCONTINUED | OUTPATIENT
Start: 2022-09-26 | End: 2022-09-27

## 2022-09-26 RX ORDER — POTASSIUM CHLORIDE 1500 MG/1
20 TABLET, EXTENDED RELEASE ORAL 2 TIMES DAILY
Status: DISCONTINUED | OUTPATIENT
Start: 2022-09-26 | End: 2022-09-27

## 2022-09-26 RX ORDER — POTASSIUM CHLORIDE 20MEQ/15ML
20 LIQUID (ML) ORAL ONCE
Status: COMPLETED | OUTPATIENT
Start: 2022-09-26 | End: 2022-09-26

## 2022-09-26 RX ORDER — PROCHLORPERAZINE 25 MG
12.5 SUPPOSITORY, RECTAL RECTAL EVERY 12 HOURS PRN
Status: DISCONTINUED | OUTPATIENT
Start: 2022-09-26 | End: 2022-09-27 | Stop reason: HOSPADM

## 2022-09-26 RX ORDER — LOPERAMIDE HCL 2 MG
2 CAPSULE ORAL 4 TIMES DAILY PRN
Status: DISCONTINUED | OUTPATIENT
Start: 2022-09-26 | End: 2022-09-27 | Stop reason: HOSPADM

## 2022-09-26 RX ORDER — ACETAMINOPHEN 650 MG/1
650 SUPPOSITORY RECTAL EVERY 6 HOURS PRN
Status: DISCONTINUED | OUTPATIENT
Start: 2022-09-26 | End: 2022-09-27 | Stop reason: HOSPADM

## 2022-09-26 RX ORDER — POTASSIUM CHLORIDE 1500 MG/1
40 TABLET, EXTENDED RELEASE ORAL ONCE
Status: DISCONTINUED | OUTPATIENT
Start: 2022-09-26 | End: 2022-09-26

## 2022-09-26 RX ORDER — SODIUM CHLORIDE AND POTASSIUM CHLORIDE 150; 900 MG/100ML; MG/100ML
INJECTION, SOLUTION INTRAVENOUS CONTINUOUS
Status: DISCONTINUED | OUTPATIENT
Start: 2022-09-26 | End: 2022-09-27 | Stop reason: HOSPADM

## 2022-09-26 RX ORDER — HYDRALAZINE HYDROCHLORIDE 25 MG/1
25 TABLET, FILM COATED ORAL 2 TIMES DAILY
Status: DISCONTINUED | OUTPATIENT
Start: 2022-09-26 | End: 2022-09-27 | Stop reason: HOSPADM

## 2022-09-26 RX ORDER — MAGNESIUM SULFATE HEPTAHYDRATE 40 MG/ML
2 INJECTION, SOLUTION INTRAVENOUS ONCE
Status: COMPLETED | OUTPATIENT
Start: 2022-09-26 | End: 2022-09-26

## 2022-09-26 RX ORDER — LISINOPRIL 40 MG/1
40 TABLET ORAL DAILY
Status: DISCONTINUED | OUTPATIENT
Start: 2022-09-27 | End: 2022-09-27 | Stop reason: HOSPADM

## 2022-09-26 RX ORDER — ACETAMINOPHEN 325 MG/1
650 TABLET ORAL EVERY 6 HOURS PRN
Status: DISCONTINUED | OUTPATIENT
Start: 2022-09-26 | End: 2022-09-27 | Stop reason: HOSPADM

## 2022-09-26 RX ORDER — POTASSIUM CHLORIDE 1500 MG/1
20 TABLET, EXTENDED RELEASE ORAL ONCE
Status: DISCONTINUED | OUTPATIENT
Start: 2022-09-26 | End: 2022-09-26

## 2022-09-26 RX ORDER — ONDANSETRON 4 MG/1
4 TABLET, ORALLY DISINTEGRATING ORAL EVERY 6 HOURS PRN
Status: DISCONTINUED | OUTPATIENT
Start: 2022-09-26 | End: 2022-09-27 | Stop reason: HOSPADM

## 2022-09-26 RX ORDER — HYDRALAZINE HYDROCHLORIDE 25 MG/1
1 TABLET, FILM COATED ORAL 2 TIMES DAILY
COMMUNITY
Start: 2022-07-12

## 2022-09-26 RX ORDER — LIDOCAINE 40 MG/G
CREAM TOPICAL
Status: DISCONTINUED | OUTPATIENT
Start: 2022-09-26 | End: 2022-09-27 | Stop reason: HOSPADM

## 2022-09-26 RX ORDER — PROCHLORPERAZINE MALEATE 5 MG
5 TABLET ORAL EVERY 6 HOURS PRN
Status: DISCONTINUED | OUTPATIENT
Start: 2022-09-26 | End: 2022-09-27 | Stop reason: HOSPADM

## 2022-09-26 RX ORDER — FAMOTIDINE 20 MG/1
1 TABLET, FILM COATED ORAL 2 TIMES DAILY PRN
COMMUNITY
Start: 2022-09-09

## 2022-09-26 RX ORDER — CLONIDINE HYDROCHLORIDE 0.1 MG/1
0.2 TABLET ORAL 2 TIMES DAILY
Status: DISCONTINUED | OUTPATIENT
Start: 2022-09-26 | End: 2022-09-27 | Stop reason: HOSPADM

## 2022-09-26 RX ORDER — CALCIUM CARBONATE 500(1250)
1000 TABLET,CHEWABLE ORAL DAILY
COMMUNITY
Start: 2022-09-09

## 2022-09-26 RX ORDER — ATENOLOL 100 MG/1
100 TABLET ORAL EVERY EVENING
COMMUNITY

## 2022-09-26 RX ORDER — POTASSIUM CHLORIDE 1500 MG/1
1 TABLET, EXTENDED RELEASE ORAL 2 TIMES DAILY
COMMUNITY
Start: 2022-09-09

## 2022-09-26 RX ORDER — ONDANSETRON 2 MG/ML
4 INJECTION INTRAMUSCULAR; INTRAVENOUS EVERY 6 HOURS PRN
Status: DISCONTINUED | OUTPATIENT
Start: 2022-09-26 | End: 2022-09-27 | Stop reason: HOSPADM

## 2022-09-26 RX ORDER — POTASSIUM CHLORIDE 20MEQ/15ML
40 LIQUID (ML) ORAL ONCE
Status: COMPLETED | OUTPATIENT
Start: 2022-09-26 | End: 2022-09-26

## 2022-09-26 RX ORDER — TRAMADOL HYDROCHLORIDE 50 MG/1
50 TABLET ORAL EVERY 6 HOURS PRN
COMMUNITY
Start: 2022-06-17

## 2022-09-26 RX ORDER — FUROSEMIDE 20 MG
20 TABLET ORAL WEEKLY
COMMUNITY
Start: 2021-04-28

## 2022-09-26 RX ADMIN — POTASSIUM CHLORIDE AND SODIUM CHLORIDE: 900; 150 INJECTION, SOLUTION INTRAVENOUS at 17:32

## 2022-09-26 RX ADMIN — Medication 25 MCG: at 17:25

## 2022-09-26 RX ADMIN — HYDRALAZINE HYDROCHLORIDE 25 MG: 25 TABLET, FILM COATED ORAL at 21:01

## 2022-09-26 RX ADMIN — POTASSIUM CHLORIDE 40 MEQ: 20 SOLUTION ORAL at 18:00

## 2022-09-26 RX ADMIN — MAGNESIUM SULFATE IN WATER 2 G: 40 INJECTION, SOLUTION INTRAVENOUS at 22:13

## 2022-09-26 RX ADMIN — CLONIDINE HYDROCHLORIDE 0.2 MG: 0.1 TABLET ORAL at 21:01

## 2022-09-26 RX ADMIN — CALCIUM CARBONATE 600 MG (1,500 MG)-VITAMIN D3 400 UNIT TABLET 2 TABLET: at 17:25

## 2022-09-26 RX ADMIN — LOPERAMIDE HYDROCHLORIDE 2 MG: 2 CAPSULE ORAL at 22:13

## 2022-09-26 RX ADMIN — ACETAMINOPHEN 650 MG: 325 TABLET, FILM COATED ORAL at 17:25

## 2022-09-26 RX ADMIN — POTASSIUM CHLORIDE 20 MEQ: 20 SOLUTION ORAL at 19:31

## 2022-09-26 RX ADMIN — ATENOLOL 100 MG: 50 TABLET ORAL at 21:01

## 2022-09-26 ASSESSMENT — ACTIVITIES OF DAILY LIVING (ADL)
ADLS_ACUITY_SCORE: 20
ADLS_ACUITY_SCORE: 29
ADLS_ACUITY_SCORE: 22
ADLS_ACUITY_SCORE: 29
ADLS_ACUITY_SCORE: 29

## 2022-09-26 NOTE — CARE PLAN
Prior to Admission Medication Reconciliation preparation:   (medlist prepared for review only- have not spoken with patient)    Medications added:   [] None  [] Cleared med list from historical, outdated medications and re-entered current medications  [x] As listed below:    tums    pepcid    tramadol    Potassium    magnesium    Medications deleted:   [] None  [] Cleared entire med list from historical, outdated medications   [x] As listed below:    Hydralazine 10 mg     Pantoprazole- discontinued admission 9/8/22 [facility]    Changes made to existing medications:   [x] None  [] Updated strengths and frequencies to most current  [] As listed below:    Allergies listed at another location:  []Allergies match allergies listed in Epic  []Other allergies listed and added to chart:    Medication reconciliation sources:   []Medlist from primary provider outside of [facility]:  [x][facility] Report Review  [x]Epic Chart Review  [x]Care Everywhere review: atenolol and lasix fill dates out of compliancy range.   Medication Sig Dispensed Refills Fill Date   [x]acetaminophen (TYLENOL) 500 MG tablet   Take 500 mg by mouth every six hours as needed for Pain . Limit acetaminophen to 4000 mg per day from all sources.   0     [x]loperamide (IMODIUM) 2 MG capsule   Take 2 mg by mouth four times a day as needed for Diarrhea.   0     [x]atenolol (Tenormin) 100 MG tablet    Indications: Hypertension Take 1 Tablet by mouth every evening. Indications: High Blood Pressure Disorder 90 Tablet   1 03/19/2021   [x]furosemide (Lasix) 20 MG tablet    Indications: Edema Take 20 mg by mouth one time a week. wednesday Indications: Edema 90 Tablet   1 2019   [x]lisinopril (Prinivil, Zestril) 40 MG tablet   Take 1 Tab by mouth one time a day. 90 Tablet   3 6/28/22   [x]traMADol (Ultram) 50 MG tablet   Take 1 Tablet by mouth every six hours as needed for Pain . 10 Tablet   0 06/17/2022   [x]cloNIDine (Catapres) 0.2 MG tablet    Indications: Hypertension  "associated with diabetes (HCC) Take 1 Tab by mouth two times a day. 180 Tablet   2 08/15/2022   [x]potassium chloride CR (K-Dur, Klor-Con M) 20 MEQ tablet   Take 1 Tablet by mouth two times a day with meals. Do not crush. 180 Tablet   3 09/09/2022   [x]famotidine (Pepcid) 20 MG tablet   Take 1 Tablet by mouth two times a day as needed for Heartburn. 60 Tablet   11 09/09/2022   [x]Magnesium Cl-Calcium Carbonate (Slow-Mag) 71.5-119 MG Tablet Delayed Release   Take 2 Tablets by mouth two times a day for 14 days, THEN 1 Tablet two times a day for 350 days. 180 Tablet   OTC    [x]hydrALAZINE (Apresoline) 25 MG tablet    Indications: Hypertension Take 1 Tablet by mouth two times a day. Indications: High Blood Pressure Disorder 180 Tablet   3 09/09/2022   [x]calcium carbonate (Tums) 500 MG chewable tablet   Chew and swallow 2 Tablets two times a day. Chew thoroughly. 200 Tablet    OTC    [x]vitamin D3, cholecalciferol, 25 mcg (1000 units) tablet   Take 1 Tablet by mouth one time a day. 1 unit of Vitamin D equals 0.025 mcg of Vitamin D 100 Tablet    OTC      []Pharmacy med list: **  []Pharmacy phone call  [x]Outside meds dispense report: see below  []Nursing home or Assisted Living MAR:  []Other: **    Is patient on coumadin?  [x]No    Pharmacy patient regularly uses:  [facility]    Approximate time frame PTA medications will be reviewed with patient or managing party:    [x]Later today when patient is admitted to floor.  []\"Likely to admit\" patient. Med list prepared and ready for review with patient by scribe or nursing (if scribe is not available) once patient has boarded or been admitted to floor.   []Nursing will have to review when patient gets to floor since scribe will not be present.     Complicated med list. Scribe will re-review the next business day ASAP.   []Medlist has no issues and will likely be reviewed with patient by nursing when admitted to floor.      Med list is straightforward and will be " "reviewed/double checked by scribe when able (if nursing is able to complete).     Any issues with completed PTA med list will be addressed and then marked \"Med scribe Complete\".    If med list is not marked completed or is not completed thoroughly, scribe will re-review.     Tere Almonte on 9/26/2022 at 2:44 PM    Medication Dispense History (from 9/26/2021 to 9/26/2022)  Expand All  Collapse All    Famotidine     Dispensed Days Supply Quantity Provider Pharmacy   FAMOTIDINE 20 MG TABLET 09/09/2022 30 60 tablet Hussain Robb MD [facility]...        Lisinopril     Dispensed Days Supply Quantity Provider Pharmacy   LISINOPRIL 40 MG TABLET 06/28/2022 90 90 tablet Yareli Retana, CINDY [facility]   LISINOPRIL 40 MG TABLET 03/09/2022 90 90 tablet Yareli Retana, CINDY [facility]   LISINOPRIL 40 MG TABLET 12/20/2021 90 90 tablet Yareli Retana, CINDY [facility]...        Pantoprazole Sodium     Dispensed Days Supply Quantity Provider Pharmacy   PANTOPRAZOLE SOD DR 40 MG TAB 08/15/2022 90 90 tablet Yareli Retana, CINDY [facility]   PANTOPRAZOLE SOD DR 40 MG TAB 05/11/2022 90 90 tablet Yareli Retana, CINDY [facility]   PANTOPRAZOLE SOD DR 40 MG TAB 02/09/2022 90 90 tablet Yareli Retana, CINDY [facility]   PANTOPRAZOLE SOD DR 40 MG TAB 10/29/2021 90 90 tablet Yareli Retana, CINDY [facility]        Potassium Chloride [facility]     Dispensed Days Supply Quantity Provider Pharmacy   POTASSIUM CL ER 20 MEQ TABLET 09/09/2022 90 180 tablet Hussain Robb MD [facility]...        cloNIDine HCl     Dispensed Days Supply Quantity Provider Pharmacy   CLONIDINE HCL 0.2 MG TABLET 08/15/2022 90 180 tablet Yareli Retana, CINDY [facility]...   CLONIDINE HCL 0.2 MG TABLET 02/10/2022 90 180 tablet Y[facility]li Retana, [facility]        hydrALAZINE HCl     Dispensed Days Supply Quantity Provider " Pharmacy   HYDRALAZINE 25 MG TABLET 07/12/2022 90 90 tablet Yareli Retana, CINDY [facility]...   HYDRALAZINE 25 MG TABLET 04/12/2022 90 90 tablet Yareli Retana, CINDY [facility]...   HYDRALAZINE 25 MG TABLET 12/23/2021 90 90 tablet Yareli Retana, CINDY [facility]...   HYDRALAZINE 10 MG TABLET 10/29/2021 90 90 tablet Yareli Retana, CINDY [facility]...        Disclaimer    Certain dispenses may not be available or accurate in this report, including over-the-counter medications, low cost prescriptions, prescriptions paid for by the patient or non-participating sources, or errors in insurance claims information. The provider should independently verify medication history with the patient.    External Sources

## 2022-09-26 NOTE — CARE PLAN
PTA meds reviewed by nursing while this writer was in room.     No changes made to PTA meds.     Notes:    Atenolol last filled 2021- pt reports she still has them and takes them in the evening but will double check when she gets home.     Lasix- last filled 2019- pt reports she still has because she takes it so infrequently.    Reports compliancy on all other meds.     Denies taking any other vitamins, supplements or analgesics other than those listed.     Confirms med changes when admitted to [facility] 9/8 are the most recent.     Manages her own meds.      Confirms [facility] pharmacy.     Tere Almonte on 9/26/2022 at 3:11 PM

## 2022-09-26 NOTE — PROGRESS NOTES
[facility]    Spiritual Health Progress Note    Date of Service (when I saw the patient): 09/26/2022     Assessment & Plan   Annie Grover is a 81 year old female who was admitted on 9/26/2022.  Introduced the patient as an initial visit to Spiritual Health Services and to see if I could connect the patient to their identified marty community.  Patient was open and happy to talk with . When asked about connection to marty community. She said she knows Pastor Vini Lara from [facility] [place]. She said she would like  to follow-up with him to let him know she is on the unit. This  followed up with Pastor Martini. Patient is interested in having other  Visits. Closed our time in prayer.    Rev. Brody Aparicio  Volunteer

## 2022-09-26 NOTE — H&P
[facility]    History and Physical  Hospitalist       Date of Admission:  9/26/2022  Date of Service (when I saw the patient): 09/26/22    Assessment & Plan       Electrolyte abnormality: Acute on chronic hypokalemia and hypomagnesemia. 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 She states her diarrhea has actually been well controlled over the last several days. Calcium level is normal when corrected for albumin. Vitals signs are stable. IV/oral replacement per protocol. Telemetry. IVF overnight at least.     Acute cystitis without hematuria: History of recurrent UTI. Blood and urine cultures pending. She was started on IV rocephin in the ED, continue same.    Benign essential hypertension: She took her regular morning medications, continue same as blood pressures are stable.         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 Loperamide PRN as at home.    Hypokalemia    Hypomagnesemia    PseudoHypocalcemia: Corrected calcium normal       DVT Prophylaxis: Pneumatic Compression Devices  Code Status: Full Code    Disposition: Expected discharge in 2-3 days once electrolytes stable.    Esha Hearn,CNP    Primary Care Physician   Yareli Retana    Chief Complaint   nausea    History is obtained from the patient    History of Present Illness   Annie Grover is a 81 year old female who presents with nausea. She reports that she noted nausea that started yesterday which is her typical symptom electrolyte abnormalities. She states her diarrhea has actually been better than normal for her. She denies actual vomiting, has been taking her pills on normal schedule. She denies any change in bladder habits or dysuria. She has not eaten anything since yesterday but is hungry now.      Past Medical History    I have reviewed this patient's  medical history and updated it with pertinent information if needed.   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 Apparently first noted by U/S in 2010, and was biopsied during emergent e-lap for perorated small bowel in 2015.     Hip stiffness, right 3/3/2021     History of endometrial cancer 12/6/2012    Formatting of this note might be different from the original. SUKUMAR/BSO with post-op XRT in 1983. Suffered bowel perforation that was apparently a late toxicity of this treatment in 2015.     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 Done for perforation that was apparently a late effect of XRT done for endometrial cancer.     Syncope 5/26/2021       Past Surgical History   I have reviewed this patient's surgical history and updated it with pertinent information if needed.  Past Surgical History:   Procedure Laterality Date     AS VAG HYST,RMV TUBE/OVARY N/A 1983     SMALL BOWEL RESECTION      due to acute colitis as late affect of radiation       Prior to Admission Medications    Prior to Admission Medications   Prescriptions Last Dose Informant Patient Reported? Taking?   Magnesium Cl-Calcium Carbonate 71.5-119 MG TBEC 9/25/2022 at 2100  Yes Yes   Sig: Take 1 tablet by mouth 2 times daily   acetaminophen (TYLENOL) 500 MG tablet Past Month at Unknown time Self Yes Yes   Sig: Take 500 mg by mouth every 6 hours as needed . Limit acetaminophen to 4000 mg per day from all sources.   atenolol (TENORMIN) 100 MG tablet 9/25/2022 at 2100  Yes Yes   Sig: Take 100 mg by mouth every evening   calcium carbonate 500 mg, elemental, 1250 (500 Ca) MG tablet chewable 9/25/2022 at 1200  Yes Yes   Sig: Take 1,000 mg by mouth daily   cholecalciferol 25 MCG (1000 UT) TABS 9/25/2022 at 0800  Yes Yes   Sig: Take 25 mcg by mouth daily   cloNIDine (CATAPRES) 0.2 MG tablet 9/25/2022 at 2100 Self Yes Yes   Sig: Take 0.2 mg by mouth 2 times daily   famotidine (PEPCID) 20 MG tablet Past Week at Unknown time  Yes Yes   Sig: Take 1 tablet by mouth 2 times daily as needed   furosemide (LASIX) 20 MG tablet Past Week at Unknown time  Yes Yes   Sig: Take 20 mg by mouth once a week . Take on Wednesday.   hydrALAZINE (APRESOLINE) 25 MG tablet 9/25/2022 at 2100  Yes Yes   Sig: Take 1 tablet by mouth 2 times daily   lisinopril (ZESTRIL) 40 MG tablet 9/26/2022 at 0800 Self Yes Yes   Sig: Take 40 mg by mouth daily   loperamide (IMODIUM) 2 MG capsule Past Week at Unknown time Self Yes Yes   Sig: Take 2 mg by mouth 4 times daily as needed for diarrhea   potassium chloride ER (KLOR-CON M) 20 MEQ CR tablet 9/26/2022 at 0800  Yes Yes   Sig: Take 1 tablet by mouth 2 times daily   traMADol (ULTRAM) 50 MG tablet Past Month at Unknown time  Yes Yes   Sig: Take 50 mg by mouth every 6 hours as needed      Facility-Administered Medications: None     Allergies   Allergies   Allergen Reactions     Baclofen      Hypertension     Cortisone Nausea and Vomiting     Lower abdominal pain, chills     Nabumetone Swelling and Rash     Spironolactone  "Diarrhea     Confusion     Sulfa Drugs Hives     Aspirin GI Disturbance     Per patient, her MD told her to never take this due to her \"thin intestinal wall, putting her at a higher risk for bleeding.\"     Erythromycin      Lactose      Nifedipine      Orange Fruit [Citrus]      Amlodipine Cramps     Abdominal pain     Beta Adrenergic Blockers Unknown     Headaches, diarrhea, vomiting- tolerates Atenolol; per patient, has tried several, but cannot remember specific drug names  Metoprolol: makes patient tired -- patient cannot sleep--patient gets heartburn      Ciprofloxacin Diarrhea     Codeine Nausea and Vomiting     Cozaar [Losartan] Cough     Darvon [Propoxyphene] Nausea and Vomiting     Dilaudid [Hydromorphone] Nausea and Vomiting     Doxazosin Nausea     Egg Yolk Nausea     Nausea only, denies reaction to Propofol     Ferrous Sulfate Diarrhea     Hydrochlorothiazide Nausea and Vomiting     Penicillins Nausea and Vomiting     Talwin [Pentazocine] Nausea and Vomiting     Vasotec [Enalapril]      GI side effects     Yellow Dye Nausea and Vomiting and Cramps       Social History   I have reviewed this patient's social history and updated it with pertinent information if needed. Annie Grover      Family History   I have reviewed this patient's family history and updated it with pertinent information if needed.   No family history on file.    Review of Systems   CONSTITUTIONAL:  negative for  fevers, chills, sweats, fatigue and malaise  HEENT:  negative for  earaches, nasal congestion and sore throat  RESPIRATORY:  negative for  dry cough, cough with sputum, dyspnea, wheezing and chest pain  CARDIOVASCULAR:  negative for  chest pain, dyspnea, palpitations, edema  GASTROINTESTINAL:  positive for nausea  negative for vomiting, change in bowel habits, abdominal pain and abdominal distention  GENITOURINARY:  negative for frequency and dysuria, positive chronic incontinence  MUSCULOSKELETAL:  negative for  " myalgias, arthralgias and muscle weakness  NEUROLOGICAL:  negative for headaches, dizziness, weakness and numbness    Physical Exam   Temp: 99.6  F (37.6  C) Temp src: Tympanic BP: 142/58 Pulse: 72   Resp: 18 SpO2: 99 % O2 Device: None (Room air)    Vital Signs with Ranges  Temp:  [99.6  F (37.6  C)] 99.6  F (37.6  C)  Pulse:  [72] 72  Resp:  [18] 18  BP: (142)/(58) 142/58  SpO2:  [99 %] 99 %  116 lbs 9.97 oz    Constitutional: Awake,alert, no acute distress  HEENT: Normocephalic, mucous membranes are pink and moist  Respiratory: Clear bilaterally without crackles, wheezes, rhonchi  Cardiovascular: HRR, no murmurs, rubs,thrills.   GI: Soft,nontender, bowel sound are positive   Skin: Pale, no unusual bruising, open areas or rashes.   Musculoskeletal: Moves all extremities, no joint abnormalities  Neurologic: Aox3, good historian, good short and long term recall.  Psychiatric: Calm, cooperative and interactive.     Data   Data reviewed today:  I personally reviewed .  Recent Labs   Lab 09/26/22  1555      POTASSIUM 3.0*   CHLORIDE 99   CO2 28   BUN 14   CR 1.09*   ANIONGAP 9   LOLI 7.2*   *       No results found for this or any previous visit (from the past 24 hour(s)).

## 2022-09-26 NOTE — PLAN OF CARE
Goal Outcome Evaluation:        [facility] Inpatient Admission Note:    Patient admitted to 3228/3228-1 at approximately 1450 via cart accompanied by other:ambulance crew from [facility] emergency room . Report received from Liv SHANNON in SBAR format at 1330 via telephone. Patient transferred to bed via slide board.. Patient is alert and oriented X 3, denies pain; rates at 0 on 0-10 scale.  Patient oriented to room, unit, hourly rounding, and plan of care. Explained admission packet and patient handbook with patient bill of rights brochure. Will continue to monitor and document as needed.     Inpatient Nursing criteria listed below was met:    Health care directives status obtained and documented:  Reports has papers at home, not scanned here    Patient identifies a surrogate decision maker: No If yes, who:n/a Contact Information:n/a     If initial lactic acid greater than 2.0, repeat lactic acid drawn within one hour of arrival to unit: NA. If no, state reason: n/a    Clergy visit ordered if patient requests: N/A    Skin issues/needs documented:  oncoming shift to assess    Isolation Patient: no Education given, correct sign in place and documentation row added to PCS:   n/a    Fall Prevention Yes: Care plan updated, education given and documented, sticker and magnet in place: Yes    Care Plan initiated: Yes    Education Documented (including assessment): Yes    Patient has discharge needs : No If yes, please explain:refused       Face to face report given with opportunity to observe patient.    Report given to Mahamed Pearson RN   9/26/2022  3:14 PM

## 2022-09-27 ENCOUNTER — APPOINTMENT (OUTPATIENT)
Dept: PHYSICAL THERAPY | Facility: HOSPITAL | Age: 82
DRG: 641 | End: 2022-09-27
Attending: NURSE PRACTITIONER
Payer: MEDICARE

## 2022-09-27 VITALS
SYSTOLIC BLOOD PRESSURE: 107 MMHG | HEIGHT: 61 IN | OXYGEN SATURATION: 97 % | TEMPERATURE: 96.8 F | BODY MASS INDEX: 22.64 KG/M2 | HEART RATE: 71 BPM | DIASTOLIC BLOOD PRESSURE: 53 MMHG | RESPIRATION RATE: 16 BRPM | WEIGHT: 119.93 LBS

## 2022-09-27 LAB
ALBUMIN SERPL-MCNC: 1.9 G/DL (ref 3.4–5)
ALP SERPL-CCNC: 47 U/L (ref 40–150)
ALT SERPL W P-5'-P-CCNC: <6 U/L (ref 0–50)
ANION GAP SERPL CALCULATED.3IONS-SCNC: 7 MMOL/L (ref 3–14)
AST SERPL W P-5'-P-CCNC: 5 U/L (ref 0–45)
BASOPHILS # BLD AUTO: 0 10E3/UL (ref 0–0.2)
BASOPHILS NFR BLD AUTO: 0 %
BILIRUB SERPL-MCNC: 0.7 MG/DL (ref 0.2–1.3)
BUN SERPL-MCNC: 19 MG/DL (ref 7–30)
CALCIUM SERPL-MCNC: 6.8 MG/DL (ref 8.5–10.1)
CHLORIDE BLD-SCNC: 106 MMOL/L (ref 94–109)
CO2 SERPL-SCNC: 24 MMOL/L (ref 20–32)
CREAT SERPL-MCNC: 1.07 MG/DL (ref 0.52–1.04)
EOSINOPHIL # BLD AUTO: 0 10E3/UL (ref 0–0.7)
EOSINOPHIL NFR BLD AUTO: 1 %
ERYTHROCYTE [DISTWIDTH] IN BLOOD BY AUTOMATED COUNT: 16 % (ref 10–15)
GFR SERPL CREATININE-BSD FRML MDRD: 52 ML/MIN/1.73M2
GLUCOSE BLD-MCNC: 112 MG/DL (ref 70–99)
HCT VFR BLD AUTO: 22.8 % (ref 35–47)
HGB BLD-MCNC: 7.5 G/DL (ref 11.7–15.7)
HGB BLD-MCNC: 8.8 G/DL (ref 11.7–15.7)
IMM GRANULOCYTES # BLD: 0 10E3/UL
IMM GRANULOCYTES NFR BLD: 1 %
LYMPHOCYTES # BLD AUTO: 0.6 10E3/UL (ref 0.8–5.3)
LYMPHOCYTES NFR BLD AUTO: 10 %
MAGNESIUM SERPL-MCNC: 2 MG/DL (ref 1.6–2.3)
MCH RBC QN AUTO: 30.4 PG (ref 26.5–33)
MCHC RBC AUTO-ENTMCNC: 32.9 G/DL (ref 31.5–36.5)
MCV RBC AUTO: 92 FL (ref 78–100)
MONOCYTES # BLD AUTO: 0.3 10E3/UL (ref 0–1.3)
MONOCYTES NFR BLD AUTO: 6 %
NEUTROPHILS # BLD AUTO: 4.6 10E3/UL (ref 1.6–8.3)
NEUTROPHILS NFR BLD AUTO: 82 %
NRBC # BLD AUTO: 0 10E3/UL
NRBC BLD AUTO-RTO: 0 /100
PLATELET # BLD AUTO: 133 10E3/UL (ref 150–450)
POTASSIUM BLD-SCNC: 3.9 MMOL/L (ref 3.4–5.3)
POTASSIUM BLD-SCNC: 4 MMOL/L (ref 3.4–5.3)
PROT SERPL-MCNC: 5.1 G/DL (ref 6.8–8.8)
RBC # BLD AUTO: 2.47 10E6/UL (ref 3.8–5.2)
SODIUM SERPL-SCNC: 137 MMOL/L (ref 133–144)
WBC # BLD AUTO: 5.6 10E3/UL (ref 4–11)

## 2022-09-27 PROCEDURE — 85018 HEMOGLOBIN: CPT | Mod: 91 | Performed by: NURSE PRACTITIONER

## 2022-09-27 PROCEDURE — 84132 ASSAY OF SERUM POTASSIUM: CPT | Performed by: NURSE PRACTITIONER

## 2022-09-27 PROCEDURE — 97530 THERAPEUTIC ACTIVITIES: CPT | Mod: GP | Performed by: PHYSICAL THERAPIST

## 2022-09-27 PROCEDURE — 99239 HOSP IP/OBS DSCHRG MGMT >30: CPT | Performed by: NURSE PRACTITIONER

## 2022-09-27 PROCEDURE — 85025 COMPLETE CBC W/AUTO DIFF WBC: CPT | Performed by: NURSE PRACTITIONER

## 2022-09-27 PROCEDURE — 97161 PT EVAL LOW COMPLEX 20 MIN: CPT | Mod: GP | Performed by: PHYSICAL THERAPIST

## 2022-09-27 PROCEDURE — 36415 COLL VENOUS BLD VENIPUNCTURE: CPT | Performed by: NURSE PRACTITIONER

## 2022-09-27 PROCEDURE — 83735 ASSAY OF MAGNESIUM: CPT | Performed by: NURSE PRACTITIONER

## 2022-09-27 PROCEDURE — 250N000011 HC RX IP 250 OP 636: Performed by: NURSE PRACTITIONER

## 2022-09-27 PROCEDURE — 250N000013 HC RX MED GY IP 250 OP 250 PS 637: Performed by: NURSE PRACTITIONER

## 2022-09-27 RX ORDER — CEFDINIR 300 MG/1
300 CAPSULE ORAL 2 TIMES DAILY
Qty: 10 CAPSULE | Refills: 0 | Status: SHIPPED | OUTPATIENT
Start: 2022-09-28 | End: 2022-10-03

## 2022-09-27 RX ORDER — POTASSIUM CHLORIDE 1500 MG/1
20 TABLET, EXTENDED RELEASE ORAL 2 TIMES DAILY
Status: DISCONTINUED | OUTPATIENT
Start: 2022-09-27 | End: 2022-09-27 | Stop reason: HOSPADM

## 2022-09-27 RX ORDER — FAMOTIDINE 20 MG/1
20 TABLET, FILM COATED ORAL DAILY PRN
Status: DISCONTINUED | OUTPATIENT
Start: 2022-09-27 | End: 2022-09-27 | Stop reason: HOSPADM

## 2022-09-27 RX ADMIN — Medication 25 MCG: at 08:24

## 2022-09-27 RX ADMIN — ACETAMINOPHEN 650 MG: 325 TABLET, FILM COATED ORAL at 03:38

## 2022-09-27 RX ADMIN — MAGNESIUM 64 MG (MAGNESIUM CHLORIDE) TABLET,DELAYED RELEASE 535 MG: at 12:50

## 2022-09-27 RX ADMIN — POTASSIUM CHLORIDE 20 MEQ: 1500 TABLET, EXTENDED RELEASE ORAL at 12:50

## 2022-09-27 RX ADMIN — POTASSIUM CHLORIDE AND SODIUM CHLORIDE: 900; 150 INJECTION, SOLUTION INTRAVENOUS at 03:39

## 2022-09-27 RX ADMIN — CLONIDINE HYDROCHLORIDE 0.2 MG: 0.1 TABLET ORAL at 08:24

## 2022-09-27 RX ADMIN — HYDRALAZINE HYDROCHLORIDE 25 MG: 25 TABLET, FILM COATED ORAL at 08:24

## 2022-09-27 RX ADMIN — LISINOPRIL 40 MG: 40 TABLET ORAL at 08:24

## 2022-09-27 RX ADMIN — CEFTRIAXONE SODIUM 1 G: 1 INJECTION, SOLUTION INTRAVENOUS at 12:40

## 2022-09-27 RX ADMIN — CALCIUM CARBONATE 600 MG (1,500 MG)-VITAMIN D3 400 UNIT TABLET 2 TABLET: at 08:25

## 2022-09-27 ASSESSMENT — ACTIVITIES OF DAILY LIVING (ADL)
ADLS_ACUITY_SCORE: 28
ADLS_ACUITY_SCORE: 26
ADLS_ACUITY_SCORE: 29
ADLS_ACUITY_SCORE: 24
ADLS_ACUITY_SCORE: 28
ADLS_ACUITY_SCORE: 26
ADLS_ACUITY_SCORE: 24

## 2022-09-27 NOTE — PLAN OF CARE
Goal Outcome Evaluation:    Plan of Care Reviewed With: patient     Overall Patient Progress: no change         VSS, afebrile, HRR, lungs clear, bowels active. Pt is up assist of 1, gait belt and walker to bedside commode. Pt is only voiding very small amounts 50 mL at a time. Pt states that this normal for her to go only small amounts. Orthostatic BP was negative. Pt denies any dizziness. Pt buttocks and crease are purple in color, barrier cream applied. Pt did receive 1 dose of tylenol for tenderness in her left hand, with relief. Pt is Alert and oriented x 4, but forgetful.     Face to face report given with opportunity to observe patient.    Report given to MIRTHA Hope RN   9/27/2022  7:18 AM

## 2022-09-27 NOTE — PROGRESS NOTES
Assessment completed by visit with Liz.    LOC: alert, oriented, pleasant     Dx: electrolyte abnormalities    Chronic Disease Management: HTN, osteoarthritis     Lives with: son, Brody and cat, Giovanni   Living at:  home  Transportation: YES Independent     Primary PCP: Yareli Retana  Insurance:  Medicare, [employer]   Medicare IMM letter reviewed with Liz.    Support System:  family  Homecare/PCA: not connected, interested in housekeeping services   /County Services:   Not connected   [place]: NO      How was the [facility] notification completed: n/a    Health Care Directive: believes she has one started   Guardian: no  POA: no    Pharmacy: [facility] or [facility]  Meds management: independently manages      Adequate Resources for needs (housing, utilities, food/med): YES  Household chores: independently manages but recognizes this is becoming more challenging and requests resources for assistance.  Per chart, EMS reported hoarding, feces throughout the house.    Work/community/social activity: YES as desired     ADLs: independently manages  Ambulation:walker utilized   Falls: three days ago- knees gave out   Nutrition: does the meal prep and shopping herself   Sleep: not addressed     Equipment used: walker, grab bars, shower chair       Oxygen supplier: no      Does the supplier have valid oxygen orders: n/a    Mental health: denies   Substance abuse: denies   Exposure to violence/abuse: no concerns voiced/identified  Stressors: denies     Able to Return to Prior Living Arrangements: YES    Choice of Vendor: n/a    Barriers: no barriers identified      SANCHO: low     Plan: return home via family anticipated

## 2022-09-27 NOTE — PROGRESS NOTES
09/27/22 0937   Signing Clinician's Name / Credentials   Signing clinician's name / credentials Pebbles Lozano DPT   Quick Adds   Rehab Discipline PT   Gait Training   Distance in Feet (Required for LE Total Joints) 120'   Therapeutic Activity   Minutes of Treatment 13 minutes   Symptoms Noted During/After Treatment Increased pain   Treatment Detail Pt ambulated additional 120'x2 with FWW CGA-SBA, demonstrates antalgic gait for the initial 5-10' but improves with distance, no LOB.  Pt managed 4 steps with bilateral rails CGA with instruction to ascend with L LE and descend with R LE to take pressure off painful right knee.  Pt did well.  Discussed discharge and pt is eager to go home, does not feel she needs further PT.  Would like help with housekeeping and laundry.  Pt left sitting in chair with call light in place and clip alarm on.   PT Discharge Planning   PT Discharge Recommendation (DC Rec) home   PT Rationale for DC Rec PT evaluation completed.  Pt is typically independent with ADLs and ambulates with FWW at baseline.  Pt states she has to stay active due to her arthritis.  Pt currently demonstrates some pain with activity but is overall tolerating well.  Will see during acute care stay to progress functional mobility but do not anticipate any further skilled PT needs upon discharge.   PT Brief overview of current status sit>stand CGA, ambulated 120' x3 with FWW CGA, 4 steps with bilateral rails CGA   Additional Documentation   Rehab Comments tolerated activity well   PT Plan Progress functional mobility and strength   Total Session Time   Total Session Time (minutes) 23 minutes  (10 eval, 13 treatment)

## 2022-09-27 NOTE — PHARMACY-MEDICATION REGIMEN REVIEW
Pharmacy Antimicrobial Stewardship Assessment     Current Antimicrobial Therapy:  Anti-infectives (From now, onward)    Start     Dose/Rate Route Frequency Ordered Stop    22 1300  cefTRIAXone in d5w (ROCEPHIN) intermittent infusion 1 g         1 g  over 30 Minutes Intravenous EVERY 24 HOURS 22 1720            Indication: UTI     Days of Therapy: 1     Pertinent Labs:  Recent Labs   Lab Test 22  0508 21  0523   WBC 5.6 5.2     Recent Labs   Lab Test 22  1555   LACT 1.2        Temperature:  Temp (24hrs), Av.8  F (36.6  C), Min:96.7  F (35.9  C), Max:99.6  F (37.6  C)    Culture Results:   : COVID = negative     From Care Everywhere       Recommendations/Interventions:  No recommendations at this time. Will continue to monitor.     Kirsty Alba RPH  2022

## 2022-09-27 NOTE — DISCHARGE INSTRUCTIONS
Appointments: * You have a Hospital Follow-up appointment scheduled for Tuesday October 4th please arrive at 1:15PM with Yareli Retana at [facility] If you need to reschedule please call [phone]

## 2022-09-27 NOTE — PLAN OF CARE
Patient discharged at 1:50 PM via wheel chair accompanied by Family and staff. Prescriptions sent to patients preferred pharmacy. All belongings sent with patient.     Discharge instructions reviewed with Pt. Listed belongings gathered and returned to patient. Pt reports having all belongings at discharge.    Patient discharged to Home.     [facility]  Follow up appointment made:  Yes  Home medications returned to patient: N/A  Patient reports pain was well managed at discharge: Yes

## 2022-09-27 NOTE — DISCHARGE SUMMARY
[facility]    Discharge Summary  Hospitalist    Date of Admission:  9/26/2022  Date of Discharge:  9/27/2022  1:50 PM  Discharging Provider: Esha Hearn CNP  Date of Service (when I saw the patient): 09/27/22    Discharge Diagnoses     Principal Problem:    Electrolyte abnormality  Active Problems:    Hypocalcemia    Diarrhea due to malabsorption    Hypokalemia    Hypomagnesemia    Benign essential hypertension      History of Present Illness   From admission: Annie Grover is a 81 year old female who presents with nausea. She reports that she noted nausea that started yesterday which is her typical symptom electrolyte abnormalities. She states her diarrhea has actually been better than normal for her. She denies actual vomiting, has been taking her pills on normal schedule. She denies any change in bladder habits or dysuria. She has not eaten anything since yesterday but is hungry now.      Hospital Course     Electrolyte abnormality: Acute on chronic hypokalemia and hypomagnesemia. 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 She states her diarrhea has actually been well controlled over the last several days. Calcium level is normal when corrected for albumin. Vitals signs are stable. IV/oral replacement protocol was initiated. All electrolyte are normal this morning, she feels well, has not had any nausea since her arrival here and is feeling well wanting to go home. Follow-up with PCP in 5-7 days with recheck of electrolytes. She is already on chronic replacement therapy, theses were not changes in setting of UTi likely causing imbalance.       Acute cystitis without hematuria: History of recurrent UTI. Blood and urine cultures pending. She was started on IV rocephin in the ED, finish course with oral omnicef x5 days.      Benign essential hypertension: She took her  regular morning medications, continue same as blood pressures are stable.           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 Loperamide PRN as at home.    Hypokalemia    Hypomagnesemia    PseudoHypocalcemia: Corrected calcium normal       Esha Hearn CNP      Pending Results     Unresulted Labs Ordered in the Past 30 Days of this Admission     No orders found for last 31 day(s).          Code Status   Full Code       Primary Care Physician   Yareli Retana     Discharge Disposition   Discharged to home  Condition at discharge: Stable    Consultations This Hospital Stay   PHYSICAL THERAPY ADULT IP CONSULT  OCCUPATIONAL THERAPY ADULT IP CONSULT    Time Spent on this Encounter   IEsha NP, personally saw the patient today and spent greater than 30 minutes discharging this patient.    Discharge Orders      Reason for your hospital stay    Abnormal electrolytes     Follow-up and recommended labs and tests     Follow up with primary care provider, Yareli Retana, within 7 days for hospital follow- up.  The following labs/tests are recommended: BMP,magnesium.     Activity    Your activity upon discharge: activity as tolerated     Diet    Follow this diet upon discharge: Orders Placed This Encounter      Combination Diet Regular Diet Adult, Regular Diet     Discharge Medications   Current Discharge Medication List      START taking these medications    Details   cefdinir (OMNICEF) 300 MG capsule Take 1 capsule (300 mg) by mouth 2 times daily for 5 days  Qty: 10 capsule, Refills: 0    Associated Diagnoses: Acute cystitis without hematuria         CONTINUE these medications which have NOT CHANGED    Details   acetaminophen (TYLENOL) 500 MG tablet Take 500 mg by mouth every 6 hours as needed . Limit acetaminophen to 4000 mg per day from all sources.      atenolol (TENORMIN) 100 MG tablet Take 100 mg by mouth every evening      calcium carbonate 500 mg,  "elemental, 1250 (500 Ca) MG tablet chewable Take 1,000 mg by mouth daily      cholecalciferol 25 MCG (1000 UT) TABS Take 25 mcg by mouth daily      cloNIDine (CATAPRES) 0.2 MG tablet Take 0.2 mg by mouth 2 times daily      famotidine (PEPCID) 20 MG tablet Take 1 tablet by mouth 2 times daily as needed      furosemide (LASIX) 20 MG tablet Take 20 mg by mouth once a week . Take on Wednesday.      hydrALAZINE (APRESOLINE) 25 MG tablet Take 1 tablet by mouth 2 times daily      lisinopril (ZESTRIL) 40 MG tablet Take 40 mg by mouth daily      loperamide (IMODIUM) 2 MG capsule Take 2 mg by mouth 4 times daily as needed for diarrhea      Magnesium Cl-Calcium Carbonate 71.5-119 MG TBEC Take 1 tablet by mouth 2 times daily      potassium chloride ER (KLOR-CON M) 20 MEQ CR tablet Take 1 tablet by mouth 2 times daily      traMADol (ULTRAM) 50 MG tablet Take 50 mg by mouth every 6 hours as needed           Allergies   Allergies   Allergen Reactions     Baclofen      Hypertension     Cortisone Nausea and Vomiting     Lower abdominal pain, chills     Nabumetone Swelling and Rash     Spironolactone Diarrhea     Confusion     Sulfa Drugs Hives     Aspirin GI Disturbance     Per patient, her MD told her to never take this due to her \"thin intestinal wall, putting her at a higher risk for bleeding.\"     Erythromycin      Lactose      Nifedipine      Orange Fruit [Citrus]      Amlodipine Cramps     Abdominal pain     Beta Adrenergic Blockers Unknown     Headaches, diarrhea, vomiting- tolerates Atenolol; per patient, has tried several, but cannot remember specific drug names  Metoprolol: makes patient tired -- patient cannot sleep--patient gets heartburn      Ciprofloxacin Diarrhea     Codeine Nausea and Vomiting     Cozaar [Losartan] Cough     Darvon [Propoxyphene] Nausea and Vomiting     Dilaudid [Hydromorphone] Nausea and Vomiting     Doxazosin Nausea     Egg Yolk Nausea     Nausea only, denies reaction to Propofol     Ferrous " Sulfate Diarrhea     Hydrochlorothiazide Nausea and Vomiting     Penicillins Nausea and Vomiting     Talwin [Pentazocine] Nausea and Vomiting     Vasotec [Enalapril]      GI side effects     Yellow Dye Nausea and Vomiting and Cramps     Data   Most Recent 3 CBC's:Recent Labs   Lab Test 09/27/22  1208 09/27/22  0508 05/27/21  0523   WBC  --  5.6 5.2   HGB 8.8* 7.5* 8.9*   MCV  --  92 94   PLT  --  133* 183      Most Recent 3 BMP's:  Recent Labs   Lab Test 09/27/22  0508 09/27/22  0019 09/26/22  1555 05/29/21  0620     --  136 137   POTASSIUM 3.9 4.0 3.0* 4.2  4.2   CHLORIDE 106  --  99 104   CO2 24  --  28 27   BUN 19  --  14 12   CR 1.07*  --  1.09* 0.70   ANIONGAP 7  --  9 6   LOLI 6.8*  --  7.2* 7.4*   *  --  115* 127*     Most Recent 2 LFT's:  Recent Labs   Lab Test 09/27/22  0508 05/28/21  0700   AST 5 12*   ALT <6 5*   ALKPHOS 47 36   BILITOTAL 0.7 1.2*     Most Recent INR's and Anticoagulation Dosing History:  Anticoagulation Dose History    There is no flowsheet data to display.       Most Recent 3 Troponin's:No lab results found.  Most Recent Cholesterol Panel:No lab results found.  Most Recent 6 Bacteria Isolates From Any Culture (See EPIC Reports for Culture Details):No lab results found.  Most Recent TSH, T4 and A1c Labs:No lab results found.  Results for orders placed or performed in visit on 07/17/15   PE NPET Skull Base To Mid Thigh Pet Pi    Narrative    PET/CT SCAN    INDICATIONS:  Endometrial cancer, for staging.    DOSE:  18.4 mCi  BLOOD GLUCOSE:  113 mg/dL    FINDINGS:  18.4 mCi of fluorine-18-deoxyglucose was injected, and  PET/CT scan was performed from the base of the brain to the upper  thigh.  Normal uptake is seen in the base of the brain.  In the neck,  no abnormal hypermetabolism is seen.  Cervical lymph nodes are normal  in caliber.  No pulmonary masses are seen.  There is no hilar or  mediastinal masses or lymphadenopathy.  In the upper abdomen, normal  uptake is seen in the  liver. There is a faint calcification centrally  within a mass in the right lobe of the liver, but no abnormal  hypermetabolism is seen associated with this lesion.  The spleen,  pancreas, adrenal glands are normal.  Normal kidney activity is seen.  The periaortic lymph nodes are normal in caliber.  In the pelvis, the  bladder and rectum appear normal.  No abnormal hypermetabolism is seen  within the pelvis.  Pelvis and inguinal lymph nodes appear normal.  Normal uptake is seen in the regional skeleton.  In the anterior  abdominal wall postoperative changes are noted and there is some  increased tracer uptake associated with the abdominal wound.    IMPRESSION:  NO EVIDENCE OF METASTATIC DISEASE FROM THE PATIENT'S  ENDOMETRIAL CANCER.  NO ABNORMAL UPTAKE IS SEEN IN THE LIVER AT THE  SITE OF LOW-DENSITY MASSES SEEN ON RECENT CT SCAN IN THE RIGHT LOBE OF  THE LIVER.  ON THE UNENHANCED SCANS, THESE LESIONS ARE NOT AS WELL  VISUALIZED AS ON THE ENHANCED SCANS FROM JUNE 26, 2015, AND I CANNOT  COMMENT ON WHETHER THERE HAS BEEN A CHANGE IN SIZE OF THESE THREE  NODULES.            SIGNATURE PAGE ONLY  Exam Date: Jul 23, 2015 09:41:49 AM  Author: MADISON GRIDER  This report is final and signed

## 2022-09-27 NOTE — PROGRESS NOTES
09/27/22 0937   Quick Adds   Type of Visit Initial PT Evaluation   Living Environment   People in Home child(barak), adult  (learning disability)   Current Living Arrangements house   Home Accessibility stairs to enter home;stairs within home   Number of Stairs, Main Entrance 3   Stair Railings, Main Entrance railings on both sides of stairs   Number of Stairs, Within Home, Primary greater than 10 stairs   Stair Railings, Within Home, Primary railings on both sides of stairs   Transportation Anticipated car, drives self   Living Environment Comments Laundry located in basement however pt can get assist if needed.   Self-Care   Usual Activity Tolerance good   Current Activity Tolerance good   Equipment Currently Used at Home walker, rolling   Fall history within last six months yes   Number of times patient has fallen within last six months 1   Activity/Exercise/Self-Care Comment Pt reports she is independent at baseline.  Pt states she has to remain active due to her arthritic knees   General Information   Onset of Illness/Injury or Date of Surgery 09/26/22   Referring Physician Esha Hearn NP   Patient/Family Therapy Goals Statement (PT) to go home   Pertinent History of Current Problem (include personal factors and/or comorbidities that impact the POC) Pt admitted with electrolyte abnormality   Cognition   Affect/Mental Status (Cognition) WNL   Orientation Status (Cognition) oriented x 4   Pain Assessment   Patient Currently in Pain No  (increased with activity in knee but did not rate)   Posture    Posture Forward head position;Protracted shoulders   Range of Motion (ROM)   Range of Motion ROM is WFL   Strength (Manual Muscle Testing)   Strength (Manual Muscle Testing) strength is WFL   Bed Mobility   Comment, (Bed Mobility) NT, up in chair upon PT arrival   Transfers   Transfers sit-stand transfer   Sit-Stand Transfer   Sit-Stand [place] (Transfers) contact guard   Assistive Device (Sit-Stand  Transfers) walker, front-wheeled   Comment, (Sit-Stand Transfer) pain in knee upon standing   Gait/Stairs (Locomotion)   [place] Level (Gait) contact guard   Assistive Device (Gait) walker, front-wheeled   Distance in Feet (Required for LE Total Joints) 120'   Pattern (Gait) step-to  (initially due to knee being stuff but progressed to step-through with distance)   Balance   Balance Comments good with support from walker   Clinical Impression   Criteria for Skilled Therapeutic Intervention Yes, treatment indicated   PT Diagnosis (PT) gait disturbance   Influenced by the following impairments pain, weakness   Functional limitations due to impairments decreased tolerance for functional mobility   Clinical Presentation (PT Evaluation Complexity) Evolving/Changing   Clinical Presentation Rationale clinical judgement   Clinical Decision Making (Complexity) low complexity   Planned Therapy Interventions (PT) gait training;patient/family education;stair training;strengthening;transfer training;progressive activity/exercise;risk factor education;home program guidelines   Risk & Benefits of therapy have been explained evaluation/treatment results reviewed;care plan/treatment goals reviewed;risks/benefits reviewed;participants included;patient   Clinical Impression Comments PT evaluation completed.  Pt is typically independent with ADLs and ambulates with FWW at baseline.  Pt states she has to stay active due to her arthritis.  Pt currently demonstrates some pain with activity but is overall tolerating well.  Will see during acute care stay to progress functional mobility but do not anticipate any further skilled PT needs upon discharge.   PT Discharge Planning   PT Discharge Recommendation (DC Rec) home   PT Rationale for DC Rec PT evaluation completed.  Pt is typically independent with ADLs and ambulates with FWW at baseline.  Pt states she has to stay active due to her arthritis.  Pt currently demonstrates some pain  with activity but is overall tolerating well.  Will see during acute care stay to progress functional mobility but do not anticipate any further skilled PT needs upon discharge.   PT Brief overview of current status sit>stand CGA, ambulated 120' with FWW CGA   Plan of Care Review   Plan of Care Reviewed With patient   Total Evaluation Time   Total Evaluation Time (Minutes) 11   Physical Therapy Goals   PT Frequency 6x/week   PT Predicted Duration/Target Date for Goal Attainment 10/04/22   PT Goals Transfers;Gait;Stairs   PT: Transfers Modified independent;Sit to/from stand;Bed to/from chair;Assistive device   PT: Gait Modified independent;Rolling walker;150 feet   PT: Stairs Modified independent;4 stairs;Rail on both sides

## 2022-09-27 NOTE — PLAN OF CARE
"Reason for hospital stay:  Electrolyte imbalances  Living situation PTA: Home with son  Most recent vitals: /62 (BP Location: Right arm, Patient Position: Semi-Rolle's, Cuff Size: Adult Regular)   Pulse 75   Temp 97.5  F (36.4  C) (Tympanic)   Resp 18   Ht 1.549 m (5' 1\")   Wt 52.9 kg (116 lb 10 oz)   SpO2 99%   BMI 22.04 kg/m      Pain Management:  Tylenol  LOC:  A&O x 4  Cardiac:  Irregular rhythm  Respiratory:  Lungs diminished  GI:  WDL  :  hesitancy  Skin Issues:  dark purple area on the buttocks region    IVF:  NS/20 mEq Potassium  ABX:  Rocephin    Nutrition: Regular diet  ADL's:  Assist x 1 with gait belt and walker  Ambulation:Walker and gait belt  Safety:  bed in lowest position, alarms in place, call light and personal items within reach, and makes needs known.  Face to face report given with opportunity to observe patient.    Report given to MIRTHA Ortiz RN   9/26/2022  11:55 PM                                "

## 2022-09-27 NOTE — PLAN OF CARE
Physical Therapy Discharge Summary    Reason for therapy discharge:    Discharged to home.    Progress towards therapy goal(s). See goals on Care Plan in [facility] electronic health record for goal details.  Goals not met.  Barriers to achieving goals:   discharge on same date as initial evaluation.    Therapy recommendation(s):    No further therapy is recommended.    Goal Outcome Evaluation:    Plan of Care Reviewed With: patient

## 2022-09-27 NOTE — PLAN OF CARE
Pt A&O with episodes of forgetfulness, VSS, afebrile. Pt lowered to floor by staff after slipping out of chair. Pt repositioned further back into chair, no red marks/injuries noted or c/o pain by pt. House supervisor, charge nurse, and provider notified. Assessments as charted. IV NS with 20 mEq K+ running at 100 mL/hr. Tele: sinus luan per ICU nurse charted report. Pt reports hesitancy with voids as chronic. Bed locked and low, call light in reach, calls appropriately.

## 2022-09-28 NOTE — UTILIZATION REVIEW
Admission Status; Secondary Review Determination       As part of the [employer] Utilization review plan, a self-audit is done on Medicare inpatient admission with less than 2 midnights stay. The 2014 IPPS Final Rule allows outpatient billing in the event that a hospital determines that an inpatient admission was not medically necessary under utilization review process.          (x) Outpatient status would be Appropriate- Short Stay- Post discharge review.     RATIONALE FOR DETERMINATION   81 year old female who presents with nausea. She reports that she noted nausea that h is her typical symptom electrolyte abnormalities. She states her diarrhea has actually been better than normal for her. She denies actual vomiting, has been taking her pills on normal schedule. She denies any change in bladder habits or dysuria.  This account and medical record number for a Medicare beneficiary was an inpatient short stay (<2 midnights) and didn't meet medical necessity for inpatient admission. We recommend billing outpatient services based on the severity of illness, intensity of service provided and the inpatient length of stay.  Please contact me within one week of receiving this letter only if you disagree with this determination. If you concur, no further action is needed.          The information on this document is developed by the utilization review team in order for the business office to ensure compliance.  This only denotes the appropriateness of proper admission status and does not reflect the quality of care rendered.         The definitions of Inpatient Status and Observation Status used in making the determination above are those provided in the CMS Coverage Manual, Chapter 1 and Chapter 6, section 70.4.      Sincerely,       TISH THAPA MD    System Medical Director  Utilization  Management  [facility].

## 2023-09-20 NOTE — PROGRESS NOTES
BP still elevated, despite clonidine and rest.  Administered IV hydrlyzine.  Follow-up /66.   [place]auc Instructions: By selecting yes to the question below the MAUC number will be added into the note.  This will be calculated automatically based on the diagnosis chosen, the size entered, the body zone selected (H,M,L) and the specific indications you chose. You will also have the option to override the Mohs AUC if you disagree with the automatically calculated number and this option is found in the Case Summary tab.
